# Patient Record
Sex: MALE | Race: WHITE | Employment: OTHER | URBAN - METROPOLITAN AREA
[De-identification: names, ages, dates, MRNs, and addresses within clinical notes are randomized per-mention and may not be internally consistent; named-entity substitution may affect disease eponyms.]

---

## 2022-03-19 PROBLEM — R42 LIGHTHEADEDNESS: Status: ACTIVE | Noted: 2020-07-15

## 2022-03-19 PROBLEM — E66.01 SEVERE OBESITY (HCC): Status: ACTIVE | Noted: 2020-02-26

## 2022-03-19 PROBLEM — R60.0 LEG EDEMA: Status: ACTIVE | Noted: 2020-07-15

## 2023-09-20 PROBLEM — F32.A MILD EPISODE OF DEPRESSION: Status: ACTIVE | Noted: 2023-09-20

## 2023-09-20 PROBLEM — R05.3 CHRONIC COUGH: Status: ACTIVE | Noted: 2023-09-20

## 2023-10-17 ENCOUNTER — OFFICE VISIT (OUTPATIENT)
Facility: CLINIC | Age: 89
End: 2023-10-17
Payer: MEDICARE

## 2023-10-17 VITALS
HEIGHT: 67 IN | SYSTOLIC BLOOD PRESSURE: 129 MMHG | DIASTOLIC BLOOD PRESSURE: 67 MMHG | BODY MASS INDEX: 33.59 KG/M2 | HEART RATE: 80 BPM | RESPIRATION RATE: 20 BRPM | TEMPERATURE: 97.3 F | WEIGHT: 214 LBS

## 2023-10-17 DIAGNOSIS — M54.50 ACUTE LEFT-SIDED LOW BACK PAIN WITHOUT SCIATICA: ICD-10-CM

## 2023-10-17 DIAGNOSIS — R05.3 CHRONIC COUGH: ICD-10-CM

## 2023-10-17 DIAGNOSIS — G25.3 SLEEP MYOCLONUS: Primary | ICD-10-CM
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
## 2023-10-17 DIAGNOSIS — G89.29 CHRONIC LOW BACK PAIN, UNSPECIFIED BACK PAIN LATERALITY, UNSPECIFIED WHETHER SCIATICA PRESENT: ICD-10-CM

## 2023-10-17 DIAGNOSIS — M54.50 CHRONIC LOW BACK PAIN, UNSPECIFIED BACK PAIN LATERALITY, UNSPECIFIED WHETHER SCIATICA PRESENT: ICD-10-CM

## 2023-10-17 PROBLEM — S32.010A COMPRESSION FRACTURE OF L1 LUMBAR VERTEBRA (HCC): Status: RESOLVED | Noted: 2023-09-20 | Resolved: 2023-10-17

## 2023-10-17 PROCEDURE — 99214 OFFICE O/P EST MOD 30 MIN: CPT | Performed by: FAMILY MEDICINE

## 2023-10-17 PROCEDURE — G8484 FLU IMMUNIZE NO ADMIN: HCPCS | Performed by: FAMILY MEDICINE

## 2023-10-17 PROCEDURE — 1123F ACP DISCUSS/DSCN MKR DOCD: CPT | Performed by: FAMILY MEDICINE

## 2023-10-17 PROCEDURE — G8427 DOCREV CUR MEDS BY ELIG CLIN: HCPCS | Performed by: FAMILY MEDICINE

## 2023-10-17 PROCEDURE — 1036F TOBACCO NON-USER: CPT | Performed by: FAMILY MEDICINE

## 2023-10-17 PROCEDURE — G8417 CALC BMI ABV UP PARAM F/U: HCPCS | Performed by: FAMILY MEDICINE

## 2023-10-17 NOTE — PROGRESS NOTES
Chief Complaint   Patient presents with    Follow-up     4 wk fu would like results for swallow test;  still having back pn and feels miserable; his leg jerking has gotten worse over the last year

## 2023-10-17 NOTE — PROGRESS NOTES
ASSESSMENT/PLAN:  Mr. Marcela Grace is a 81 y.o. male established patient who presents for Follow-up (4 wk fu would like results for swallow test;  still having back pn and feels miserable; his leg jerking has gotten worse over the last year /)  , found to have the followin. Sleep myoclonus  Assessment & Plan:   unsure of etiology ,could be stress or back injury keeping him up, fading in and out of REM sleep. no caffeien or nicotine. there is some stresses between loved ones regarding health problems. 2. Acute left-sided low back pain without sciatica  Assessment & Plan:   Uncontrolled, get imaging to ensure no new compression fractures. Get in with physical therapy. try topical NSAIDs, heat, rest.  Orders:  -     XR THORACOLUMBAR SPINE (MIN 2 VIEWS); Future  -     diclofenac sodium (VOLTAREN) 1 % GEL; Apply 4 g topically 4 times daily, Topical, 4 TIMES DAILY Starting Tue 10/17/2023, Disp-350 g, R-3, Normal  -     [address] - Physical Therapy at [place], [place]  3. Chronic low back pain, unspecified back pain laterality, unspecified whether sciatica present  -     XR THORACOLUMBAR SPINE (MIN 2 VIEWS); Future  -     diclofenac sodium (VOLTAREN) 1 % GEL; Apply 4 g topically 4 times daily, Topical, 4 TIMES DAILY Starting Tue 10/17/2023, Disp-350 g, R-3, Normal  -     [address] - Physical Therapy at [place], [place]  4. Fall, initial encounter  Assessment & Plan:   Uncontrolled, changes made today: get physical therapy with balance and gait training. Orders:  -     XR THORACOLUMBAR SPINE (MIN 2 VIEWS); Future  -     [address] - Physical Therapy at [place], [place]  5. Chronic cough  Assessment & Plan:   Well-controlled, continue current plan        It was a pleasure seeing Mr. Marcela Grace today. Return in about 8 weeks (around 2023) for low back pain management.       SUBJECTIVE:     HPI  Mr. Marcela Grace is a 81 y.o. male established patient who presents for the following

## 2023-10-17 NOTE — ASSESSMENT & PLAN NOTE
Uncontrolled, get imaging to ensure no new compression fractures. Get in with physical therapy.  try topical NSAIDs, heat, rest.

## 2023-10-17 NOTE — ASSESSMENT & PLAN NOTE
unsure of etiology ,could be stress or back injury keeping him up, fading in and out of REM sleep. no caffeien or nicotine. there is some stresses between loved ones regarding health problems.

## 2023-10-27 ENCOUNTER — TELEPHONE (OUTPATIENT)
Facility: CLINIC | Age: 89
End: 2023-10-27

## 2023-11-07 ENCOUNTER — HOSPITAL ENCOUNTER (OUTPATIENT)
Facility: HOSPITAL | Age: 89
Discharge: HOME | End: 2023-11-10
Payer: MEDICARE

## 2023-11-07 DIAGNOSIS — M54.50 ACUTE LEFT-SIDED LOW BACK PAIN WITHOUT SCIATICA: ICD-10-CM

## 2023-11-07 DIAGNOSIS — M54.50 CHRONIC LOW BACK PAIN, UNSPECIFIED BACK PAIN LATERALITY, UNSPECIFIED WHETHER SCIATICA PRESENT: ICD-10-CM
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
## 2023-11-07 DIAGNOSIS — G89.29 CHRONIC LOW BACK PAIN, UNSPECIFIED BACK PAIN LATERALITY, UNSPECIFIED WHETHER SCIATICA PRESENT: ICD-10-CM

## 2023-11-07 PROCEDURE — 72100 X-RAY EXAM L-S SPINE 2/3 VWS: CPT

## 2023-11-07 PROCEDURE — 72072 X-RAY EXAM THORAC SPINE 3VWS: CPT

## 2023-11-15 ENCOUNTER — APPOINTMENT (OUTPATIENT)
Facility: HOSPITAL | Age: 89
End: 2023-11-15
Payer: MEDICARE

## 2023-11-16 ENCOUNTER — APPOINTMENT (OUTPATIENT)
Facility: HOSPITAL | Age: 89
End: 2023-11-16
Payer: MEDICARE
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
## 2023-11-17 ENCOUNTER — HOSPITAL ENCOUNTER (OUTPATIENT)
Facility: HOSPITAL | Age: 89
Setting detail: RECURRING SERIES
Discharge: HOME | End: 2023-11-20
Payer: MEDICARE

## 2023-11-17 PROCEDURE — 97161 PT EVAL LOW COMPLEX 20 MIN: CPT

## 2023-11-17 PROCEDURE — 97140 MANUAL THERAPY 1/> REGIONS: CPT

## 2023-11-17 PROCEDURE — 97535 SELF CARE MNGMENT TRAINING: CPT

## 2023-11-17 NOTE — THERAPY EVALUATION
[facility]   a part of [facility]  [address] [facility], [address] [place] [address]   Jemma Alexis                                                    [facility]:[phone]   [facility]:[phone]                                                                                 PHYSICAL THERAPY LYMPHEDEMA - MEDICARE EVALUATION/PLAN OF CARE NOTE (updated 3/23)      Date: 2023          Patient Name:  Jada Nogueira. :  1934   Medical   Diagnosis:  Lymphedema, not elsewhere classified [I89.0] Treatment Diagnosis:  I89.0     Lymphedema, not elsewhere classified, R26.89   Other abnormalities of gait and mobility, and R60.9     Edema, unspecified    Referral Source:  Adrianna Arango MD  Provider #:  [id number]                Insurance: Payor: MEDICARE / Plan: MEDICARE PART A AND B / Product Type: *No Product type* /        Patient  verified yes     Visit #   Current  / Total 1 N/A   Time   In / Out 1425 1535   Total Treatment Time 70   Total Timed Codes 55   1:1 Treatment Time 79      [facility] Totals Reminder:  bill using total billable   min of TIMED therapeutic procedures and modalities. 8-22 min = 1 unit; 23-37 min = 2 units; 38-52 min = 3 units;  53-67 min = 4 units; 68-82 min = 5 units         SUBJECTIVE  Pain Level (0-10 scale): pt denies pain   []constant [x]intermittent []improving []worsening []no change since onset    Any medication changes, allergies to medications, adverse drug reactions, diagnosis change, or new procedure performed?: [x] No    [] Yes (see summary sheet for update)  Medications: Verified on Patient Summary List    Subjective functional status/changes:     Pt is an 80 yo male seen today for 6 month re-evaluation of bilateral LE secondary lymphedema, L>R. [facility]o [facility] leg velcro garments, says he wears them every day and wears them on left leg every night.  Pt does not wear foot wraps when going out, but wears lace up shoe to

## 2023-11-17 NOTE — THERAPY EVALUATION
Statement of Medical Necessity    Page 1 of 2      Landis Kawasaki. 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    1934  Page 2 of 2    Physician Order for DME for Diagnosis of Secondary Lymphedema as Listed on Statement of Medical Necessity, Page 1        Recommended Product:  Units Upper Extremity Rt Lt Units Lower Extremity Rt Lt    Circ-Aid, Ready Wrap, Sigvaris Arm   4 Inelastic binders (Circ-Aid, Farrow)  [x]Foot   [x]Below Knee   []Knee   []Thigh x x    Circ-Aid Ready Wrap, Sigvaris hand    Francis Juan R, night use []Full Leg  []Lower Leg      Tribute Arm, night use    Tribute, night use  []Full Leg  []Lower Leg      Francis Juan R Arm, night use    Jose Sleeve Leg/ Foot, night use      Gradiant Compression Sleeves & Gloves  []Custom [] RM Arm:  []CCL1 []CCL2 []CCL3  []Custom [] RM Glove: []CCL1 []CCL2 []CCL3      Gradient Compression Stockings   []Custom  []RM Lower Extremity:   []CCL1       []CCL2         []CCL3   []Knee       []Thigh        []Waist Length      Jose sleeve arm w/ hand, night use    Tribute Wrap, night use      Compression Bra          Compression Vest         The above patient was referred for treatment of Lymphedema due to the diagnosis

## 2023-11-22 ENCOUNTER — TELEPHONE (OUTPATIENT)
Facility: CLINIC | Age: 89
End: 2023-11-22

## 2023-12-18 RX ORDER — PANTOPRAZOLE SODIUM 40 MG/1
40 TABLET, DELAYED RELEASE ORAL
Qty: 90 TABLET | Refills: 0 | OUTPATIENT
Start: 2023-12-18

## 2024-01-04 ENCOUNTER — HOSPITAL ENCOUNTER (OUTPATIENT)
Facility: HOSPITAL | Age: 89
Setting detail: RECURRING SERIES
Discharge: HOME | End: 2024-01-07
Payer: MEDICARE

## 2024-01-04 PROCEDURE — 97140 MANUAL THERAPY 1/> REGIONS: CPT

## 2024-01-04 PROCEDURE — 97760 ORTHOTIC MGMT&TRAING 1ST ENC: CPT

## 2024-01-04 NOTE — PROGRESS NOTES
PHYSICAL THERAPY/OCCUPATIONAL THERAPY - MEDICARE DAILY TREATMENT NOTE (updated 3/23)      Date: 2024          Patient Name:  Clifton Liz Jr. :  1934   Medical   Diagnosis:  Lymphedema, not elsewhere classified [I89.0] Treatment Diagnosis:  I89.0     Lymphedema, not elsewhere classified, R26.89   Other abnormalities of gait and mobility, and R60.9     Edema, unspecified    Referral Source:  Gayatri Vaca, * Insurance:   Payor: MEDICARE / Plan: MEDICARE PART A AND B / Product Type: *No Product type* /                     Patient  verified yes     Visit #   Current  / Total 2 6 per POC   Time   In / Out 1130 1240   Total Treatment Time 70   Total Timed Codes 70   1:1 Treatment Time 70       [facility] Totals Reminder:  bill using total billable   min of TIMED therapeutic procedures and modalities.   8-22 min = 1 unit; 23-37 min = 2 units; 38-52 min = 3 units;  53-67 min = 4 units; 68-82 min = 5 units         SUBJECTIVE    Pain Level (0-10 scale): pt denies pain     Any medication changes, allergies to medications, adverse drug reactions, diagnosis change, or new procedure performed?: [x] No    [] Yes (see summary sheet for update)  Medications: Verified on Patient Summary List    Subjective functional status/changes:     Pt arrives to appt wearing Juzo lower leg velcro garments. Pt says he has been wearing garments daily on both legs and on left LE at night. Pt has not used pump since last visit as discussed. Pt has been going to outpatient PT 2x/week for strengthening/balance training. Pt denies any falls since last visit. No changes to medical history since last visit. Pt agreeable to treatment.     OBJECTIVE      Therapeutic Procedures:  Tx Min Billable or 1:1 Min (if diff from Tx Min) Procedure, Rationale, Specifics   30  44814 Manual Therapy (timed):  increase tissue extensibility and decrease edema to improve patient's ability to progress to PLOF and address remaining functional goals.

## 2024-01-04 NOTE — PROGRESS NOTES
[facility]   a part of [facility]  [address], [address]   [place] [address]   Phone: [phone]  Fax: [phone]      DISCHARGE SUMMARY  Patient Name: Clifton Liz Jr. : 1934   Treatment/Medical Diagnosis: Lymphedema, not elsewhere classified [I89.0]   Referral Source: Gayatri Vaca, *     Date of Initial Visit: 23 Attended Visits: 2 Missed Visits: 0     SUMMARY OF TREATMENT  Pt seen today for volume recheck after discontinuing use of pump in November since pt/wife having difficult time using pump. Pt's limb volumes increased this visit, however remain down since 2023. Pt currently wearing garments that are older and velcro not adhering well. Pt unsure where newer velcro wraps are that were fit in May 2023. Volumes may be up due to garments not providing enough compression as the straps are stretched out and not staying in place. Will send order for new garments to [facility] in size Large Long since current wraps are too short. Pt does not wear foot wrap and foot swelling seems managed adequately at this time with use of hybrid liners. Discussed using pump 2-3x/week to lessen the burden on pt's spouse. Pt/spouse in agreement with plan. Recommend pt return in 6 months for re-evaluation or sooner as needed. Goals met and pt discharged to phase II CDT today.     CURRENT STATUS    Patient to perform 5/5 lymphedema remedial exercises in session with modified independence utilizing HEP handout, in order to promote optimal independence with management of condition, as well as promote optimal limb volume reduction required for proper fit of donned clothing in 6 weeks.   Status at last Eval: not met   Current Status: Met 24  Goal Met?  yes    2.  Patient/Caregiver to verbalize 3/3 signs and symptoms of infection without external cueing, in order to promote optimal self-management of condition in 6 weeks.   Status at last Eval: not met

## 2024-02-29 ENCOUNTER — TELEPHONE (OUTPATIENT)
Age: 89
End: 2024-02-29

## 2024-02-29 NOTE — TELEPHONE ENCOUNTER
Niesha from [facility] was calling to see if we got a prescription for patients compression garments   This was faxed over on February 19 and will refax to nurses side station

## 2024-02-29 NOTE — TELEPHONE ENCOUNTER
Nurse returned call and advised patient no longer a patient at this office and gave new pcp number to call with any questions.

## 2024-03-23 ENCOUNTER — HOSPITAL ENCOUNTER (EMERGENCY)
Facility: HOSPITAL | Age: 89
Discharge: HOME | End: 2024-03-23
Attending: EMERGENCY MEDICINE
Payer: MEDICARE

## 2024-03-23 ENCOUNTER — APPOINTMENT (OUTPATIENT)
Facility: HOSPITAL | Age: 89
End: 2024-03-23
Payer: MEDICARE

## 2024-03-23 VITALS
DIASTOLIC BLOOD PRESSURE: 75 MMHG | WEIGHT: 210 LBS | HEIGHT: 67 IN | BODY MASS INDEX: 32.96 KG/M2 | RESPIRATION RATE: 17 BRPM | OXYGEN SATURATION: 97 % | HEART RATE: 99 BPM | TEMPERATURE: 98.4 F | SYSTOLIC BLOOD PRESSURE: 146 MMHG

## 2024-03-23 DIAGNOSIS — R05.1 ACUTE COUGH: Primary | ICD-10-CM

## 2024-03-23 PROCEDURE — 71046 X-RAY EXAM CHEST 2 VIEWS: CPT

## 2024-03-23 PROCEDURE — 99283 EMERGENCY DEPT VISIT LOW MDM: CPT

## 2024-03-23 RX ORDER — CETIRIZINE HYDROCHLORIDE 5 MG/1
5 TABLET ORAL DAILY
Qty: 30 TABLET | Refills: 0 | Status: SHIPPED | OUTPATIENT
Start: 2024-03-23 | End: 2024-04-22

## 2024-03-23 RX ORDER — GUAIFENESIN 600 MG/1
600 TABLET, EXTENDED RELEASE ORAL 2 TIMES DAILY
Qty: 30 TABLET | Refills: 0 | Status: SHIPPED | OUTPATIENT
Start: 2024-03-23 | End: 2024-04-07

## 2024-03-23 ASSESSMENT — ENCOUNTER SYMPTOMS
SORE THROAT: 0
CONSTIPATION: 0
SHORTNESS OF BREATH: 0

## 2024-03-23 ASSESSMENT — PAIN SCALES - GENERAL: PAINLEVEL_OUTOF10: 0

## 2024-03-23 ASSESSMENT — PAIN - FUNCTIONAL ASSESSMENT: PAIN_FUNCTIONAL_ASSESSMENT: 0-10

## 2024-03-23 NOTE — ED TRIAGE NOTES
Patient arrives in the ED ambulatory, alert and oriented x 4, breaths even and unlabored and in no acute distress with complaints of cough x2 days pt denies sob or chest pain or being around anyone sick.    Pt denies productive cough ear pain and throat pain.

## 2024-03-23 NOTE — ED PROVIDER NOTES
mouth daily    BETAMETHASONE DIPROPIONATE 0.05 % OINTMENT    Apply topically    COLCHICINE (COLCRYS) 0.6 MG TABLET    One PO stat, repeat in 2 hours, then one PO QD    DICLOFENAC SODIUM (VOLTAREN) 1 % GEL    Apply 4 g topically 4 times daily    HYDROCORTISONE 2.5 % CREAM    Apply topically 2 times daily    VITAMIN D PO    Take by mouth       ALLERGIES     Patient has no known allergies.    FAMILY HISTORY       Family History   Problem Relation Age of Onset    No Known Problems Half-Brother     No Known Problems Half-Sister     Cancer Father         leukemia    Cancer Mother     No Known Problems Daughter     No Known Problems Son     No Known Problems Son           SOCIAL HISTORY       Social History     Socioeconomic History    Marital status:      Spouse name: None    Number of children: None    Years of education: None    Highest education level: None   Tobacco Use    Smoking status: Former    Smokeless tobacco: Never   Substance and Sexual Activity    Alcohol use: Yes    Drug use: Never     Social Determinants of Health     Physical Activity: Inactive (9/20/2023)    Exercise Vital Sign     Days of Exercise per Week: 0 days     Minutes of Exercise per Session: 0 min           PHYSICAL EXAM    (up to 7 for level 4, 8 or more for level 5)     ED Triage Vitals [03/23/24 1015]   BP Temp Temp Source Pulse Respirations SpO2 Height Weight - Scale   (!) 146/75 98.4 °F (36.9 °C) Oral 99 17 97 % 1.702 m (5' 7\") 95.3 kg (210 lb)       Body mass index is 32.89 kg/m².    Physical Exam  Constitutional:       Appearance: He is not ill-appearing.   HENT:      Head: Normocephalic.      Mouth/Throat:      Mouth: Mucous membranes are moist.   Eyes:      General: No scleral icterus.  Cardiovascular:      Rate and Rhythm: Normal rate and regular rhythm.   Pulmonary:      Effort: Pulmonary effort is normal. No respiratory distress.      Breath sounds: No wheezing or rhonchi.   Abdominal:      General: There is no distension.

## 2024-03-29 ENCOUNTER — OFFICE VISIT (OUTPATIENT)
Facility: CLINIC | Age: 89
End: 2024-03-29

## 2024-03-29 VITALS
SYSTOLIC BLOOD PRESSURE: 114 MMHG | BODY MASS INDEX: 32.18 KG/M2 | HEIGHT: 67 IN | RESPIRATION RATE: 20 BRPM | TEMPERATURE: 97.6 F | OXYGEN SATURATION: 93 % | DIASTOLIC BLOOD PRESSURE: 67 MMHG | WEIGHT: 205 LBS | HEART RATE: 105 BPM

## 2024-03-29 DIAGNOSIS — M10.9 GOUT, UNSPECIFIED CAUSE, UNSPECIFIED CHRONICITY, UNSPECIFIED SITE: ICD-10-CM

## 2024-03-29 DIAGNOSIS — J06.9 VIRAL URI: ICD-10-CM

## 2024-03-29 DIAGNOSIS — R73.03 PREDIABETES: ICD-10-CM

## 2024-03-29 DIAGNOSIS — B35.6 JOCK ITCH: Primary | ICD-10-CM

## 2024-03-29 DIAGNOSIS — B30.9 VIRAL CONJUNCTIVITIS, RIGHT EYE: ICD-10-CM

## 2024-03-29 PROBLEM — R53.83 FATIGUE: Status: RESOLVED | Noted: 2024-03-29 | Resolved: 2024-03-29

## 2024-03-29 PROBLEM — D36.9 TUBULOVILLOUS ADENOMA: Status: RESOLVED | Noted: 2020-05-20 | Resolved: 2024-03-29

## 2024-03-29 PROBLEM — R60.9 FLUID RETENTION: Status: RESOLVED | Noted: 2024-03-29 | Resolved: 2024-03-29

## 2024-03-29 PROBLEM — R42 LIGHTHEADEDNESS: Status: RESOLVED | Noted: 2020-07-15 | Resolved: 2024-03-29

## 2024-03-29 PROBLEM — M19.90 ARTHRITIS: Status: RESOLVED | Noted: 2024-03-29 | Resolved: 2024-03-29

## 2024-03-29 PROBLEM — M17.12 ARTHRITIS OF LEFT KNEE: Status: RESOLVED | Noted: 2019-07-18 | Resolved: 2024-03-29

## 2024-03-29 PROBLEM — M54.50 ACUTE LEFT-SIDED LOW BACK PAIN WITHOUT SCIATICA: Status: RESOLVED | Noted: 2023-10-17 | Resolved: 2024-03-29

## 2024-03-29 RX ORDER — CLOTRIMAZOLE 1 %
1 CREAM (GRAM) TOPICAL 2 TIMES DAILY
COMMUNITY
Start: 2024-02-29

## 2024-03-29 RX ORDER — PRENATAL VIT 91/IRON/FOLIC/DHA 28-975-200
COMBINATION PACKAGE (EA) ORAL
Qty: 42 G | Refills: 3 | Status: SHIPPED | OUTPATIENT
Start: 2024-03-29

## 2024-03-29 ASSESSMENT — PATIENT HEALTH QUESTIONNAIRE - PHQ9
SUM OF ALL RESPONSES TO PHQ QUESTIONS 1-9: 0
SUM OF ALL RESPONSES TO PHQ QUESTIONS 1-9: 0
1. LITTLE INTEREST OR PLEASURE IN DOING THINGS: NOT AT ALL
SUM OF ALL RESPONSES TO PHQ QUESTIONS 1-9: 0
SUM OF ALL RESPONSES TO PHQ QUESTIONS 1-9: 0
SUM OF ALL RESPONSES TO PHQ9 QUESTIONS 1 & 2: 0
2. FEELING DOWN, DEPRESSED OR HOPELESS: NOT AT ALL

## 2024-03-29 NOTE — PATIENT INSTRUCTIONS
To help with cough you will want to use anything that would help with sinus congestion and runny nose, such as Flonase and or azelastine nasal spray which is an antihistamine nasal spray that can both be found over-the-counter.  You can also do Laila pot rinsing if you feel comfortable doing it which involves flushing sterile saline up your nose.  Other options include saline nasal spray such as oceans nasal spray which is also found over-the-counter.  Ultimately time will help to resolve this acute cough.  It can take 1 to 2 months to see complete resolution of the cough though it should continue to get slowly better each week.    Recommend changing washcloth every use to avoid re-infection and spread.   Try second topical antifungal to use with clotrimazole.

## 2024-03-29 NOTE — PROGRESS NOTES
SUBJECTIVES  with respective ASSESSMENT/PLAN:  Mr. Clifton Liz is a 89 y.o. male established patient who presents for Follow-up (Fu back pain has resolved slightly.  Has been having productive cough times two weeks green in nature.  No fevers noted. Does have loss of appetite/)  , found to have the followin. Jock itch  Overview:  Dx last year, returned because ran out too early on meds with no refills.  Restarted clotrimazole cream twice a day which has helped to be back infection for the most part, primarily under the armpits, though it has been resistant to the groin area.  Patient showers once every day or 2.  Less frequently in the winter.  Is using the same washcloth though and has not had that wash in a while.  Also is not doing specific air drying techniques.  Though does change underwear once a day.  Assessment & Plan:  Uncontrolled, seems to partially respond to clotrimazole cream, will add terbinafine cream to take as well for 2 different mechanisms of treatment.  Do recommend that we change washcloths every use given likely reinfection and associated spread to the armpits and some to the beard line as well.  Will continue to work on hygiene and drying efforts and hopefully this will be resolved in the next few months.  Orders:  -     terbinafine (ATHLETES FOOT, TERBINAFINE,) 1 % cream; Apply topically 2 times daily., Disp-42 g, R-3, Normal  2. Viral URI  3. Viral conjunctivitis, right eye  Overview:  2 days in the setting of 7 days of upper respiratory infection and acute cough. Copious postnasal drips, no trouble breathing or upset stomach/diarrhea. No fever/chills.    No problems with vision. No itching eye.  Assessment & Plan:  Conservative cares recommended, if getting swollen eye or trouble with vision, should see eye doctor as soon as possible. no indicatio nfor allergy drops today. No indications for topical antibiotic today. No injury appreciated.  4. Gout, unspecified cause,

## 2024-03-29 NOTE — PROGRESS NOTES
Chief Complaint   Patient presents with    Follow-up     Fu back pain has resolved slightly.  Has been having productive cough times two weeks green in nature.  No fevers noted. Does have loss of appetite

## 2024-03-29 NOTE — ASSESSMENT & PLAN NOTE
Uncontrolled, seems to partially respond to clotrimazole cream, will add terbinafine cream to take as well for 2 different mechanisms of treatment.  Do recommend that we change washcloths every use given likely reinfection and associated spread to the armpits and some to the beard line as well.  Will continue to work on hygiene and drying efforts and hopefully this will be resolved in the next few months.

## 2024-03-29 NOTE — ASSESSMENT & PLAN NOTE
Conservative cares recommended, if getting swollen eye or trouble with vision, should see eye doctor as soon as possible. no indicatio nfor allergy drops today. No indications for topical antibiotic today. No injury appreciated.

## 2024-05-07 NOTE — TELEPHONE ENCOUNTER
[facility] pharmacy [address] faxed refill request:    Hydrocortisone 2.5% cream   Quantity: 28 Gm twenty eight  Sig: Apply to affected area twice a day.   Kiera

## 2024-05-10 ENCOUNTER — HOSPITAL ENCOUNTER (EMERGENCY)
Age: 89
Discharge: HOME | End: 2024-05-10

## 2024-06-20 ENCOUNTER — OFFICE VISIT (OUTPATIENT)
Facility: CLINIC | Age: 89
End: 2024-06-20

## 2024-06-20 VITALS
WEIGHT: 205 LBS | TEMPERATURE: 97.1 F | RESPIRATION RATE: 20 BRPM | DIASTOLIC BLOOD PRESSURE: 66 MMHG | HEART RATE: 76 BPM | BODY MASS INDEX: 32.11 KG/M2 | SYSTOLIC BLOOD PRESSURE: 114 MMHG | OXYGEN SATURATION: 98 %

## 2024-06-20 DIAGNOSIS — R73.03 PREDIABETES: Primary | ICD-10-CM

## 2024-06-20 DIAGNOSIS — B35.6 JOCK ITCH: ICD-10-CM

## 2024-06-20 DIAGNOSIS — N50.9 SKIN LESION OF SCROTUM: ICD-10-CM

## 2024-06-20 DIAGNOSIS — R05.2 SUBACUTE COUGH: ICD-10-CM

## 2024-06-20 NOTE — ASSESSMENT & PLAN NOTE
Exam normal, slight increased post nasal dripage. Likely phlegm clearing of nighttime drainage with breakfast, vs mild reflux. Recommended time, OTC PPI/pepcid, return in 2 months if not resolved.

## 2024-06-20 NOTE — ASSESSMENT & PLAN NOTE
Ddx: epidermal inclusion cysts/sebaceous cysts of scrotum vs scrotal calcinosis (less likely, not as bad looking and a rare condition), less likely tophi given nontender though does have hx gout unmedicated.

## 2024-06-20 NOTE — PROGRESS NOTES
Chief Complaint   Patient presents with    Follow-up     Pt reports for 3 mo fu has concerns for: no improvement with rash in scrotum.  Went to derm for same thing and was told to put tea bags on his rash for ten minutes a day

## 2024-06-20 NOTE — PROGRESS NOTES
SUBJECTIVES  with respective ASSESSMENT/PLAN:  Mr. Clifton Liz is a 89 y.o. male established patient who presents for Follow-up (Pt reports for 3 mo fu has concerns for: no improvement with rash in scrotum.  Went to derm for same thing and was told to put tea bags on his rash for ten minutes a day /)  , found to have the followin. Prediabetes  Overview:  Hemoglobin A1C   Date Value Ref Range Status   03/15/2023 6.0 (H) 4.0 - 5.6 % Final     Comment:     NEW METHOD  PLEASE NOTE NEW REFERENCE RANGE  (NOTE)  HbA1C Interpretive Ranges  <5.7              Normal  5.7 - 6.4         Consider Prediabetes  >6.5              Consider Diabetes        Noted previously, recheck  Assessment & Plan:   Unclear control, continue current plan pending work up below  2. Subacute cough  Overview:  2 weeks of mild morning cough while eating breakfast, no present during other meal times, feels a bit \"phlegmy\" while eating first meal, leads to cough. Negatie swallow study for similar symptoms last year, responded empirically to short round of PPIs. Doing well otherwise, no systemic or GI symptoms, no respiratory complaints.   Assessment & Plan:  Exam normal, slight increased post nasal dripage. Likely phlegm clearing of nighttime drainage with breakfast, vs mild reflux. Recommended time, OTC PPI/pepcid, return in 2 months if not resolved.  3. Jock itch  Overview:  Dx last year, returned because ran out too early on meds with no refills.  Restarted clotrimazole cream twice a day which has helped to be back infection for the most part, primarily under the armpits, though it has been resistant to the groin area.  Patient showers once every day or 2.  Less frequently in the winter.  Is using the same washcloth though and has not had that wash in a while.  Also is not doing specific air drying techniques.  Though does change underwear once a day.    Last visit topical antifungal creams, resolved the itch and redness.   Assessment &

## 2024-07-16 DIAGNOSIS — I89.0 LYMPHEDEMA: Primary | ICD-10-CM

## 2024-07-25 ENCOUNTER — HOSPITAL ENCOUNTER (OUTPATIENT)
Facility: HOSPITAL | Age: 89
Setting detail: RECURRING SERIES
Discharge: HOME | End: 2024-07-28
Payer: MEDICARE

## 2024-07-25 DIAGNOSIS — M10.9 GOUT, UNSPECIFIED CAUSE, UNSPECIFIED CHRONICITY, UNSPECIFIED SITE: ICD-10-CM

## 2024-07-25 DIAGNOSIS — R73.03 PREDIABETES: ICD-10-CM

## 2024-07-25 PROCEDURE — 97161 PT EVAL LOW COMPLEX 20 MIN: CPT

## 2024-07-25 PROCEDURE — 97535 SELF CARE MNGMENT TRAINING: CPT

## 2024-07-25 NOTE — THERAPY EVALUATION
[facility]   a part of [facility]  [address], [address]   [place] [address]                                                    Phone:[phone]   Fax:[phone]                                                                                 PHYSICAL THERAPY LYMPHEDEMA - MEDICARE EVALUATION/PLAN OF CARE NOTE (updated 3/23)      Date: 2024          Patient Name:  Clifton Liz Jr. :  1934   Medical   Diagnosis:  Lymphedema, not elsewhere classified [I89.0] Treatment Diagnosis:  I89.0     Lymphedema, not elsewhere classified, R26.89   Other abnormalities of gait and mobility, and R60.9     Edema, unspecified    Referral Source:  Jose J Escobar MD  Provider #:  [id number]                Insurance: Payor: MEDICARE / Plan: MEDICARE PART A AND B / Product Type: *No Product type* /        Patient  verified yes     Visit #   Current  / Total 1 N/A   Time   In / Out 1428 1510   Total Treatment Time 42   Total Timed Codes 30   1:1 Treatment Time 30      [facility] Totals Reminder:  bill using total billable   min of TIMED therapeutic procedures and modalities.   8-22 min = 1 unit; 23-37 min = 2 units; 38-52 min = 3 units;  53-67 min = 4 units; 68-82 min = 5 units         SUBJECTIVE  Pain Level (0-10 scale): pt denies pain   []constant [x]intermittent []improving []worsening []no change since onset    Any medication changes, allergies to medications, adverse drug reactions, diagnosis change, or new procedure performed?: [x] No    [] Yes (see summary sheet for update)  Medications: Verified on Patient Summary List    Subjective functional status/changes:     Pt is an 89 yo male seen today for 6 month re-evaluation of bilateral LE secondary lymphedema, L>R. Patient arrives to appt wearing Juzo lower leg velcro garments, says he wears them every day and wears them on left leg every night. Pt does not wear foot wraps when going out, but wears lace up shoe to 
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 [x]   I87.2 Venous Insufficiency   [x]   I89.0 Lymphedema, other secondary  []   I83.019 Venous Stasis Ulcer LE, Right   []   I89.9 Unspecified Lymphatic Disorder []   I83.029 Venous Stasis Ulcer LE, Left   [x]   R60.9 Swelling not relieved by elevation []   Q82.0 Hereditary/ Congenital Lymphedema   []   C50.211 Malignant neoplasm of breast, Right []   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   []   C50.212 Malignant neoplasm of breast, Left []   L03.115 LE Cellulitis, Right   []    []   L03.116 LE Cellulitis, Left                                                           Clifton Liz JrSavanna    1934  Page 2 of 2    Physician Order for DME for Diagnosis of Secondary Lymphedema as Listed on Statement of Medical Necessity, Page 1        Recommended Product:  Units Upper Extremity Rt Lt Units Lower Extremity Rt Lt    Circ-Aid, Ready Wrap, Sigvaris Arm   4 Inelastic binders (Circ-Aid, Farrow)  [x]Foot   [x]Below Knee   []Knee   []Thigh x x    Circ-Aid Ready Wrap, Sigvaris hand    Francis Juan R, night use []Full Leg  []Lower Leg      Tribute Arm, night use    Tribute, night use  []Full Leg  []Lower Leg      Francis Juan R Arm, night use    Jose Sleeve Leg/ Foot, night use      Gradiant Compression Sleeves & Gloves  []Custom [] RM Arm:  []CCL1 []CCL2 []CCL3  []Custom [] RM Glove: []CCL1 []CCL2 []CCL3      Gradient Compression Stockings   []Custom  []RM Lower Extremity:   []CCL1       []CCL2         []CCL3   []Knee       []Thigh        []Waist Length      Jose sleeve arm w/ hand, night use    Tribute Wrap, night use      Compression Bra          Compression Vest         The above patient was referred for treatment of Lymphedema due to the diagnosis 
 used

## 2024-07-26 LAB
ALBUMIN SERPL-MCNC: 3.8 G/DL (ref 3.5–5)
ALBUMIN/GLOB SERPL: 1.3 (ref 1.1–2.2)
ALP SERPL-CCNC: 68 U/L (ref 45–117)
ALT SERPL-CCNC: 22 U/L (ref 12–78)
ANION GAP SERPL CALC-SCNC: 4 MMOL/L (ref 5–15)
AST SERPL-CCNC: 18 U/L (ref 15–37)
BILIRUB SERPL-MCNC: 0.6 MG/DL (ref 0.2–1)
BUN SERPL-MCNC: 14 MG/DL (ref 6–20)
BUN/CREAT SERPL: 19 (ref 12–20)
CALCIUM SERPL-MCNC: 9.5 MG/DL (ref 8.5–10.1)
CHLORIDE SERPL-SCNC: 104 MMOL/L (ref 97–108)
CO2 SERPL-SCNC: 31 MMOL/L (ref 21–32)
CREAT SERPL-MCNC: 0.73 MG/DL (ref 0.7–1.3)
ERYTHROCYTE [DISTWIDTH] IN BLOOD BY AUTOMATED COUNT: 14.2 % (ref 11.5–14.5)
EST. AVERAGE GLUCOSE BLD GHB EST-MCNC: 120 MG/DL
GLOBULIN SER CALC-MCNC: 3 G/DL (ref 2–4)
GLUCOSE SERPL-MCNC: 106 MG/DL (ref 65–100)
HBA1C MFR BLD: 5.8 % (ref 4–5.6)
HCT VFR BLD AUTO: 44.1 % (ref 36.6–50.3)
HGB BLD-MCNC: 14.5 G/DL (ref 12.1–17)
MCH RBC QN AUTO: 30.8 PG (ref 26–34)
MCHC RBC AUTO-ENTMCNC: 32.9 G/DL (ref 30–36.5)
MCV RBC AUTO: 93.6 FL (ref 80–99)
NRBC # BLD: 0 K/UL (ref 0–0.01)
NRBC BLD-RTO: 0 PER 100 WBC
PLATELET # BLD AUTO: 253 K/UL (ref 150–400)
PMV BLD AUTO: 10.8 FL (ref 8.9–12.9)
POTASSIUM SERPL-SCNC: 4.6 MMOL/L (ref 3.5–5.1)
PROT SERPL-MCNC: 6.8 G/DL (ref 6.4–8.2)
RBC # BLD AUTO: 4.71 M/UL (ref 4.1–5.7)
SODIUM SERPL-SCNC: 139 MMOL/L (ref 136–145)
URATE SERPL-MCNC: 6.7 MG/DL (ref 3.5–7.2)
WBC # BLD AUTO: 8.2 K/UL (ref 4.1–11.1)

## 2024-12-18 ENCOUNTER — OFFICE VISIT (OUTPATIENT)
Facility: CLINIC | Age: 89
End: 2024-12-18

## 2024-12-18 ENCOUNTER — TELEPHONE (OUTPATIENT)
Facility: CLINIC | Age: 89
End: 2024-12-18

## 2024-12-18 VITALS
OXYGEN SATURATION: 95 % | WEIGHT: 211 LBS | BODY MASS INDEX: 33.12 KG/M2 | HEART RATE: 80 BPM | RESPIRATION RATE: 20 BRPM | TEMPERATURE: 98 F | SYSTOLIC BLOOD PRESSURE: 113 MMHG | HEIGHT: 67 IN | DIASTOLIC BLOOD PRESSURE: 75 MMHG

## 2024-12-18 DIAGNOSIS — M10.9 GOUT, UNSPECIFIED CAUSE, UNSPECIFIED CHRONICITY, UNSPECIFIED SITE: ICD-10-CM

## 2024-12-18 DIAGNOSIS — Z00.00 MEDICARE ANNUAL WELLNESS VISIT, SUBSEQUENT: Primary | ICD-10-CM

## 2024-12-18 DIAGNOSIS — F32.A MILD EPISODE OF DEPRESSION: ICD-10-CM

## 2024-12-18 DIAGNOSIS — G45.9 TIA (TRANSIENT ISCHEMIC ATTACK): ICD-10-CM

## 2024-12-18 DIAGNOSIS — B35.3 TINEA PEDIS OF RIGHT FOOT: ICD-10-CM

## 2024-12-18 DIAGNOSIS — L21.9 SEBORRHEIC DERMATITIS: ICD-10-CM

## 2024-12-18 DIAGNOSIS — R73.03 PREDIABETES: ICD-10-CM

## 2024-12-18 RX ORDER — CLOPIDOGREL BISULFATE 75 MG/1
75 TABLET ORAL DAILY
COMMUNITY

## 2024-12-18 RX ORDER — KETOCONAZOLE 20 MG/ML
SHAMPOO, SUSPENSION TOPICAL DAILY PRN
Qty: 120 ML | Refills: 5 | Status: SHIPPED | OUTPATIENT
Start: 2024-12-18

## 2024-12-18 SDOH — ECONOMIC STABILITY: INCOME INSECURITY: HOW HARD IS IT FOR YOU TO PAY FOR THE VERY BASICS LIKE FOOD, HOUSING, MEDICAL CARE, AND HEATING?: NOT HARD AT ALL

## 2024-12-18 SDOH — ECONOMIC STABILITY: FOOD INSECURITY: WITHIN THE PAST 12 MONTHS, THE FOOD YOU BOUGHT JUST DIDN'T LAST AND YOU DIDN'T HAVE MONEY TO GET MORE.: NEVER TRUE

## 2024-12-18 SDOH — ECONOMIC STABILITY: FOOD INSECURITY: WITHIN THE PAST 12 MONTHS, YOU WORRIED THAT YOUR FOOD WOULD RUN OUT BEFORE YOU GOT MONEY TO BUY MORE.: NEVER TRUE

## 2024-12-18 ASSESSMENT — PATIENT HEALTH QUESTIONNAIRE - PHQ9
2. FEELING DOWN, DEPRESSED OR HOPELESS: NOT AT ALL
6. FEELING BAD ABOUT YOURSELF - OR THAT YOU ARE A FAILURE OR HAVE LET YOURSELF OR YOUR FAMILY DOWN: NOT AT ALL
SUM OF ALL RESPONSES TO PHQ QUESTIONS 1-9: 0
8. MOVING OR SPEAKING SO SLOWLY THAT OTHER PEOPLE COULD HAVE NOTICED. OR THE OPPOSITE, BEING SO FIGETY OR RESTLESS THAT YOU HAVE BEEN MOVING AROUND A LOT MORE THAN USUAL: NOT AT ALL
SUM OF ALL RESPONSES TO PHQ QUESTIONS 1-9: 0
SUM OF ALL RESPONSES TO PHQ QUESTIONS 1-9: 0
7. TROUBLE CONCENTRATING ON THINGS, SUCH AS READING THE NEWSPAPER OR WATCHING TELEVISION: NOT AT ALL
3. TROUBLE FALLING OR STAYING ASLEEP: NOT AT ALL
4. FEELING TIRED OR HAVING LITTLE ENERGY: NOT AT ALL
SUM OF ALL RESPONSES TO PHQ9 QUESTIONS 1 & 2: 0
9. THOUGHTS THAT YOU WOULD BE BETTER OFF DEAD, OR OF HURTING YOURSELF: NOT AT ALL
10. IF YOU CHECKED OFF ANY PROBLEMS, HOW DIFFICULT HAVE THESE PROBLEMS MADE IT FOR YOU TO DO YOUR WORK, TAKE CARE OF THINGS AT HOME, OR GET ALONG WITH OTHER PEOPLE: NOT DIFFICULT AT ALL
1. LITTLE INTEREST OR PLEASURE IN DOING THINGS: NOT AT ALL
5. POOR APPETITE OR OVEREATING: NOT AT ALL
SUM OF ALL RESPONSES TO PHQ QUESTIONS 1-9: 0

## 2024-12-18 ASSESSMENT — LIFESTYLE VARIABLES
HOW MANY STANDARD DRINKS CONTAINING ALCOHOL DO YOU HAVE ON A TYPICAL DAY: 1 OR 2
HOW OFTEN DO YOU HAVE A DRINK CONTAINING ALCOHOL: 2-4 TIMES A MONTH

## 2024-12-18 NOTE — PROGRESS NOTES
Chief Complaint   Patient presents with    Medicare AWV     Pt was recently discharged from  for a mini stroke, will request records   1. Pt unable to drive at this time- pt is scheduled with neurology, and has been advised he will need to follow up with him.   2. Jock itch cream not working      \"Have you been to the ER, urgent care clinic since your last visit?  Hospitalized since your last visit?\"    NO    “Have you seen or consulted any other health care providers outside of [facility] since your last visit?”    NO            Click Here for Release of Records Request  
additional education material    Poor Eating Habits/Diet:  Do you eat balanced/healthy meals regularly?: (!) No  Interventions:  See AVS for additional education material    Abnormal BMI (obese):  Body mass index is 33.05 kg/m². (!) Abnormal  Interventions:  See AVS for additional education material           Safety:  Do you have any tripping hazards - loose or unsecured carpets or rugs?: (!) Yes  Interventions:  See AVS for additional education material                   Objective   Vitals:    12/18/24 1032   BP: 113/75   Pulse: 80   Resp: 20   Temp: 98 °F (36.7 °C)   TempSrc: Temporal   SpO2: 95%   Weight: 95.7 kg (211 lb)   Height: 1.702 m (5' 7\")      Body mass index is 33.05 kg/m².        Physical Exam  Constitutional: well-appearing, no acute distress  Eyes: EOM intact. PERRL bilateral. Not icteric.  Cardiac: normal rate, regular rhythm.  Pulm: normal rate, normal effort.  Skin: normal color and turgor. Mild scrotal erythema, much improved from prior.  Lower extrem: no edema.              No Known Allergies  Prior to Visit Medications    Medication Sig Taking? Authorizing Provider   clopidogrel (PLAVIX) 75 MG tablet Take 1 tablet by mouth daily Yes Lakisha Berry MD   ketoconazole (NIZORAL) 2 % shampoo Apply topically daily as needed for Itching (scalp and beard during shower) Apply topically daily as needed. Yes Jose J Escobar MD   hydrocortisone 2.5 % cream Apply topically 2 times daily Yes Jose J Escobar MD   terbinafine (ATHLETES FOOT, TERBINAFINE,) 1 % cream Apply topically 2 times daily. Yes Jose J Escobar MD   clotrimazole (LOTRIMIN) 1 % cream Apply 1 Application topically 2 times daily Yes Lakisha Berry MD   VITAMIN D PO Take by mouth Yes Lakisha Berry MD   betamethasone dipropionate 0.05 % ointment Apply topically Yes Automatic Reconciliation, Ar   aspirin 81 MG EC tablet Take 1 tablet by mouth daily  Patient not taking: Reported on 12/18/2024  Automatic Reconciliation, Ar

## 2024-12-18 NOTE — TELEPHONE ENCOUNTER
Pt forgot to ask if he could get the same Home Health Care that his wife is getting.  PT, OT. She has [facility] and wants the same for him.

## 2024-12-18 NOTE — PATIENT INSTRUCTIONS
For jock itch, change wash cloth daily, contniue creams, and work on treating your athlete's foot.    For the athlete's foot, use antifungal cream, change socks daily, and spray shoes with antifungal dry powder spray.     The creams and sprays can be found at any pharmacy over the counter.         Learning About Being Active as an Older Adult  Why is being active important as you get older?     Being active is one of the best things you can do for your health. And it's never too late to start. Being active--or getting active, if you aren't already--has definite benefits. It can:  Give you more energy,  Keep your mind sharp.  Improve balance to reduce your risk of falls.  Help you manage chronic illness with fewer medicines.  No matter how old you are, how fit you are, or what health problems you have, there is a form of activity that will work for you. And the more physical activity you can do, the better your overall health will be.  What kinds of activity can help you stay healthy?  Being more active will make your daily activities easier. Physical activity includes planned exercise and things you do in daily life. There are four types of activity:  Aerobic.  Doing aerobic activity makes your heart and lungs strong.  Includes walking, dancing, and gardening.  Aim for at least 2½ hours spread throughout the week.  It improves your energy and can help you sleep better.  Muscle-strengthening.  This type of activity can help maintain muscle and strengthen bones.  Includes climbing stairs, using resistance bands, and lifting or carrying heavy loads.  Aim for at least twice a week.  It can help protect the knees and other joints.  Stretching.  Stretching gives you better range of motion in joints and muscles.  Includes upper arm stretches, calf stretches, and gentle yoga.  Aim for at least twice a week, preferably after your muscles are warmed up from other activities.  It can help you function better in daily

## 2024-12-19 ENCOUNTER — TELEPHONE (OUTPATIENT)
Facility: CLINIC | Age: 89
End: 2024-12-19

## 2024-12-19 NOTE — TELEPHONE ENCOUNTER
Erma Jones called from [facility] to advise Dr. Escobar they've been unable to provide services for pt, stating his wife has declined all appointments offered for the past 3 days and advised them they both would be out of town for the holidays.     If Dr. Escobar feels pt is in need of services, Erma is asking if we can reach out to pt. Tonom

## 2024-12-20 NOTE — TELEPHONE ENCOUNTER
Spoke with patient and wife and they said they have not declined any appts. Patient's wife said [facility] has not called to schedule any appointments for Bill that she can recall. She asked that we call Erma and let her know that they want to move forward with scheduling for patient. I left a vm for Erma letting her know that information. Ms. Obregon also has [employer] phone number and will call them later to see if she can get this sorted out.     Zacarias

## 2025-01-20 ENCOUNTER — TELEPHONE (OUTPATIENT)
Facility: CLINIC | Age: 89
End: 2025-01-20

## 2025-01-20 NOTE — TELEPHONE ENCOUNTER
Pt didn't want to take his medication this morning b-12 and plavix he has an appointment tomorrow. Wanted to make sure it would be ok to skip a dose

## 2025-01-20 NOTE — TELEPHONE ENCOUNTER
Called and spoke with pt's wife.     Pt's wife confirms pt has gotten out of bed and is about to take both medications.     Pt will keep scheduled appt for tomorrow.

## 2025-01-21 ENCOUNTER — OFFICE VISIT (OUTPATIENT)
Facility: CLINIC | Age: 89
End: 2025-01-21
Payer: MEDICARE

## 2025-01-21 VITALS
HEART RATE: 89 BPM | RESPIRATION RATE: 20 BRPM | TEMPERATURE: 98 F | BODY MASS INDEX: 33.43 KG/M2 | HEIGHT: 67 IN | OXYGEN SATURATION: 94 % | SYSTOLIC BLOOD PRESSURE: 149 MMHG | WEIGHT: 213 LBS | DIASTOLIC BLOOD PRESSURE: 86 MMHG

## 2025-01-21 DIAGNOSIS — G45.9 TIA (TRANSIENT ISCHEMIC ATTACK): ICD-10-CM

## 2025-01-21 PROCEDURE — 99214 OFFICE O/P EST MOD 30 MIN: CPT | Performed by: FAMILY MEDICINE

## 2025-01-21 PROCEDURE — 1123F ACP DISCUSS/DSCN MKR DOCD: CPT | Performed by: FAMILY MEDICINE

## 2025-01-21 PROCEDURE — 1125F AMNT PAIN NOTED PAIN PRSNT: CPT | Performed by: FAMILY MEDICINE

## 2025-01-21 PROCEDURE — 1036F TOBACCO NON-USER: CPT | Performed by: FAMILY MEDICINE

## 2025-01-21 PROCEDURE — G8428 CUR MEDS NOT DOCUMENT: HCPCS | Performed by: FAMILY MEDICINE

## 2025-01-21 PROCEDURE — G8417 CALC BMI ABV UP PARAM F/U: HCPCS | Performed by: FAMILY MEDICINE

## 2025-01-21 SDOH — ECONOMIC STABILITY: FOOD INSECURITY: WITHIN THE PAST 12 MONTHS, YOU WORRIED THAT YOUR FOOD WOULD RUN OUT BEFORE YOU GOT MONEY TO BUY MORE.: NEVER TRUE

## 2025-01-21 SDOH — ECONOMIC STABILITY: FOOD INSECURITY: WITHIN THE PAST 12 MONTHS, THE FOOD YOU BOUGHT JUST DIDN'T LAST AND YOU DIDN'T HAVE MONEY TO GET MORE.: NEVER TRUE

## 2025-01-21 ASSESSMENT — PATIENT HEALTH QUESTIONNAIRE - PHQ9
SUM OF ALL RESPONSES TO PHQ QUESTIONS 1-9: 0
1. LITTLE INTEREST OR PLEASURE IN DOING THINGS: NOT AT ALL
SUM OF ALL RESPONSES TO PHQ QUESTIONS 1-9: 0
SUM OF ALL RESPONSES TO PHQ9 QUESTIONS 1 & 2: 0
2. FEELING DOWN, DEPRESSED OR HOPELESS: NOT AT ALL
2. FEELING DOWN, DEPRESSED OR HOPELESS: NOT AT ALL
SUM OF ALL RESPONSES TO PHQ QUESTIONS 1-9: 0
SUM OF ALL RESPONSES TO PHQ9 QUESTIONS 1 & 2: 0
8. MOVING OR SPEAKING SO SLOWLY THAT OTHER PEOPLE COULD HAVE NOTICED. OR THE OPPOSITE, BEING SO FIGETY OR RESTLESS THAT YOU HAVE BEEN MOVING AROUND A LOT MORE THAN USUAL: NOT AT ALL
SUM OF ALL RESPONSES TO PHQ QUESTIONS 1-9: 0
4. FEELING TIRED OR HAVING LITTLE ENERGY: NOT AT ALL
7. TROUBLE CONCENTRATING ON THINGS, SUCH AS READING THE NEWSPAPER OR WATCHING TELEVISION: NOT AT ALL
SUM OF ALL RESPONSES TO PHQ QUESTIONS 1-9: 0
SUM OF ALL RESPONSES TO PHQ QUESTIONS 1-9: 0
6. FEELING BAD ABOUT YOURSELF - OR THAT YOU ARE A FAILURE OR HAVE LET YOURSELF OR YOUR FAMILY DOWN: NOT AT ALL
10. IF YOU CHECKED OFF ANY PROBLEMS, HOW DIFFICULT HAVE THESE PROBLEMS MADE IT FOR YOU TO DO YOUR WORK, TAKE CARE OF THINGS AT HOME, OR GET ALONG WITH OTHER PEOPLE: NOT DIFFICULT AT ALL
5. POOR APPETITE OR OVEREATING: NOT AT ALL
9. THOUGHTS THAT YOU WOULD BE BETTER OFF DEAD, OR OF HURTING YOURSELF: NOT AT ALL
SUM OF ALL RESPONSES TO PHQ QUESTIONS 1-9: 0
1. LITTLE INTEREST OR PLEASURE IN DOING THINGS: NOT AT ALL
SUM OF ALL RESPONSES TO PHQ QUESTIONS 1-9: 0
3. TROUBLE FALLING OR STAYING ASLEEP: NOT AT ALL

## 2025-01-21 NOTE — PROGRESS NOTES
Assessment & Plan  1. Recent fall.  He experienced a fall on Smooth night while applying lotion to his foot, resulting in a minor skin tear. The wound does not exhibit signs of infection and is not severe enough to warrant suturing. He reports chest pain, which is likely due to a pulled muscle rather than a fractured rib, as evidenced by the absence of bruising and the location of tenderness. Bacitracin ointment will be provided for wound care, with instructions to transition to regular Vaseline and keep the area covered. The wound is expected to heal within 1 to 2 weeks. A chest x-ray was offered to rule out rib fracture but was declined.    We talked about mechanical fall prevention, has just graduated physical therapy, but will continue working on exercises.    It was a pleasure seeing Mr. Clifton Liz today.  No follow-ups on file.    History of Present Illness  The patient is a 89-year-old male who presents for evaluation of a recent fall.    He experienced a fall on Smooth night, which he attributes to a loss of balance while applying lotion to his foot. He recalls the incident clearly, noting that he fell sideways and hit his head on a picture frame. He did not experience any pre-fall symptoms such as dizziness, lightheadedness, chest pain, or shortness of breath. There was no loss of consciousness during the event. He reports mild scalp tenderness when pressure is applied but has not developed any other symptoms of confusion. He has not had any trouble walking since the fall. He also reports chest pain, which he believes is unrelated to the fall. He suspects a rib injury, although there is no visible bruising. He does not experience pain during respiration but notes a slight twinge of discomfort. He finds it difficult to rise from a seated position due to chest soreness.    The following portions of the patient's history were reviewed and updated as appropriate: allergies, current medications, family

## 2025-01-21 NOTE — PROGRESS NOTES
Chief Complaint   Patient presents with    Fall     01/19/2025- pt states he feel and hit his head   Bruised his left arm      \"Have you been to the ER, urgent care clinic since your last visit?  Hospitalized since your last visit?\"    NO    “Have you seen or consulted any other health care providers outside of [facility] since your last visit?”    NO            Click Here for Release of Records Request

## 2025-01-24 DIAGNOSIS — I89.0 LYMPHEDEMA: Primary | ICD-10-CM

## 2025-01-29 ENCOUNTER — HOSPITAL ENCOUNTER (OUTPATIENT)
Facility: HOSPITAL | Age: 89
Setting detail: RECURRING SERIES
Discharge: HOME | End: 2025-02-01
Payer: MEDICARE

## 2025-01-29 PROCEDURE — 97760 ORTHOTIC MGMT&TRAING 1ST ENC: CPT

## 2025-01-29 PROCEDURE — 97535 SELF CARE MNGMENT TRAINING: CPT

## 2025-01-29 PROCEDURE — 97161 PT EVAL LOW COMPLEX 20 MIN: CPT

## 2025-01-29 NOTE — THERAPY EVALUATION
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 [x]   I87.2 Venous Insufficiency   [x]   I89.0 Lymphedema, other secondary  []   I83.019 Venous Stasis Ulcer LE, Right   []   I89.9 Unspecified Lymphatic Disorder []   I83.029 Venous Stasis Ulcer LE, Left   [x]   R60.9 Swelling not relieved by elevation []   Q82.0 Hereditary/ Congenital Lymphedema   []   C50.211 Malignant neoplasm of breast, Right []   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   []   C50.212 Malignant neoplasm of breast, Left []   L03.115 LE Cellulitis, Right   []    []   L03.116 LE Cellulitis, Left                                                           Clifton Liz JrSavanna    1934  Page 2 of 2    Physician Order for DME for Diagnosis of Secondary Lymphedema as Listed on Statement of Medical Necessity, Page 1        Recommended Product:  Units Upper Extremity Rt Lt Units Lower Extremity Rt Lt    Circ-Aid, Ready Wrap, Sigvaris Arm   4 Inelastic binders (Circ-Aid, Farrow)  [x]Foot   [x]Below Knee   []Knee   []Thigh x x    Circ-Aid Ready Wrap, Sigvaris hand    Francis Juan R, night use []Full Leg  []Lower Leg      Tribute Arm, night use    Tribute, night use  []Full Leg  []Lower Leg      Francis Juan R Arm, night use    Jose Sleeve Leg/ Foot, night use      Gradiant Compression Sleeves & Gloves  []Custom [] RM Arm:  []CCL1 []CCL2 []CCL3  []Custom [] RM Glove: []CCL1 []CCL2 []CCL3      Gradient Compression Stockings   []Custom  []RM Lower Extremity:   []CCL1       []CCL2         []CCL3   []Knee       []Thigh        []Waist Length      Jose sleeve arm w/ hand, night use    Tribute Wrap, night use      Compression Bra          Compression Vest         The above patient was referred for treatment of Lymphedema due to the diagnosis

## 2025-01-29 NOTE — THERAPY EVALUATION
[facility]   a part of [facility]  [address], [address]   [place] [address]                                                    Phone:[phone]   Fax:[phone]                                                                                 PHYSICAL THERAPY LYMPHEDEMA - MEDICARE EVALUATION/PLAN OF CARE NOTE (updated 3/23)      Date: 2025          Patient Name:  Clifton Liz Jr. :  1934   Medical   Diagnosis:  Lymphedema [I89.0] Treatment Diagnosis:  I89.0     Lymphedema, not elsewhere classified, R26.89   Other abnormalities of gait and mobility, and R60.9     Edema, unspecified    Referral Source:  Jose J Escobar MD  Provider #:  [id number]                Insurance: Payor: MEDICARE / Plan: MEDICARE PART A AND B / Product Type: *No Product type* /        Patient  verified yes     Visit #   Current  / Total 1 6 per POC   Time   In / Out 1230 1340   Total Treatment Time 70   Total Timed Codes 45   1:1 Treatment Time 45      [facility] Totals Reminder:  bill using total billable   min of TIMED therapeutic procedures and modalities.   8-22 min = 1 unit; 23-37 min = 2 units; 38-52 min = 3 units;  53-67 min = 4 units; 68-82 min = 5 units         SUBJECTIVE  Pain Level (0-10 scale): L shoulder, chest wall: 6/10 since fall on 25   []constant [x]intermittent []improving []worsening []no change since onset    Any medication changes, allergies to medications, adverse drug reactions, diagnosis change, or new procedure performed?: [x] No    [] Yes (see summary sheet for update)  Medications: Verified on Patient Summary List    Subjective functional status/changes:     Pt is a 89 yo male seen today for 6 month re-evaluation of bilateral LE secondary lymphedema, L>R. Patient arrives to appt wearing Juzo lower leg velcro garments, says he wears them every day and wears them on left leg every night. Pt does not wear foot wraps when going out, but wears

## 2025-02-04 ENCOUNTER — TELEPHONE (OUTPATIENT)
Facility: CLINIC | Age: 89
End: 2025-02-04

## 2025-02-04 NOTE — TELEPHONE ENCOUNTER
Lab orders placed. 12/18/2024.    Please call pt, and ask that male have labs drawn prior to scheduled appt, so results can be discussed at that time.

## 2025-02-10 RX ORDER — CLOTRIMAZOLE 1 %
CREAM (GRAM) TOPICAL
Qty: 120 G | Refills: 3 | Status: SHIPPED | OUTPATIENT
Start: 2025-02-10

## 2025-02-18 ENCOUNTER — OFFICE VISIT (OUTPATIENT)
Facility: CLINIC | Age: 89
End: 2025-02-18
Payer: MEDICARE

## 2025-02-18 VITALS
SYSTOLIC BLOOD PRESSURE: 117 MMHG | RESPIRATION RATE: 16 BRPM | OXYGEN SATURATION: 95 % | TEMPERATURE: 99 F | DIASTOLIC BLOOD PRESSURE: 70 MMHG | BODY MASS INDEX: 32.8 KG/M2 | HEART RATE: 92 BPM | HEIGHT: 67 IN | WEIGHT: 209 LBS

## 2025-02-18 DIAGNOSIS — E53.8 VITAMIN B 12 DEFICIENCY: Primary | ICD-10-CM

## 2025-02-18 DIAGNOSIS — Z86.73 HISTORY OF STROKE: ICD-10-CM

## 2025-02-18 PROBLEM — G45.9 TIA (TRANSIENT ISCHEMIC ATTACK): Status: RESOLVED | Noted: 2024-12-18 | Resolved: 2025-02-18

## 2025-02-18 PROCEDURE — 1159F MED LIST DOCD IN RCRD: CPT | Performed by: STUDENT IN AN ORGANIZED HEALTH CARE EDUCATION/TRAINING PROGRAM

## 2025-02-18 PROCEDURE — 1123F ACP DISCUSS/DSCN MKR DOCD: CPT | Performed by: STUDENT IN AN ORGANIZED HEALTH CARE EDUCATION/TRAINING PROGRAM

## 2025-02-18 PROCEDURE — 99214 OFFICE O/P EST MOD 30 MIN: CPT | Performed by: STUDENT IN AN ORGANIZED HEALTH CARE EDUCATION/TRAINING PROGRAM

## 2025-02-18 PROCEDURE — 1126F AMNT PAIN NOTED NONE PRSNT: CPT | Performed by: STUDENT IN AN ORGANIZED HEALTH CARE EDUCATION/TRAINING PROGRAM

## 2025-02-18 PROCEDURE — G8417 CALC BMI ABV UP PARAM F/U: HCPCS | Performed by: STUDENT IN AN ORGANIZED HEALTH CARE EDUCATION/TRAINING PROGRAM

## 2025-02-18 PROCEDURE — G8427 DOCREV CUR MEDS BY ELIG CLIN: HCPCS | Performed by: STUDENT IN AN ORGANIZED HEALTH CARE EDUCATION/TRAINING PROGRAM

## 2025-02-18 PROCEDURE — 1036F TOBACCO NON-USER: CPT | Performed by: STUDENT IN AN ORGANIZED HEALTH CARE EDUCATION/TRAINING PROGRAM

## 2025-02-18 RX ORDER — CLOPIDOGREL BISULFATE 75 MG/1
75 TABLET ORAL DAILY
Qty: 90 TABLET | Refills: 0 | Status: SHIPPED | OUTPATIENT
Start: 2025-02-18

## 2025-02-18 ASSESSMENT — ENCOUNTER SYMPTOMS
GASTROINTESTINAL NEGATIVE: 1
RESPIRATORY NEGATIVE: 1
ALLERGIC/IMMUNOLOGIC NEGATIVE: 1
EYES NEGATIVE: 1

## 2025-02-18 NOTE — PATIENT INSTRUCTIONS
This is what we discussed today:    Complete blood work ordered by Dr. Escobar and today's blood work ( Vitamin B12 and Folate levels).  I have refilled your Plavix.       Thank you for taking an active part in your health management!

## 2025-02-18 NOTE — PROGRESS NOTES
Family Medicine Office Visit  Patient: Clifton Liz Jr.  1934, 89 y.o., male  Encounter Date: 2/18/2025      CHIEF COMPLAINT  Chief Complaint   Patient presents with    Other     Clifton Liz Jr. is a 89 y.o. male who presents for B12 supplement and cognitive evaluation. Discuss medications.       SUBJECTIVE  Pat is new to me at this visit.   PCP Dr. Escobar.     Clifton Liz Jr. is a 89 y.o. male presenting for medication reevaluation.  Was last seen in office 1/21/25 by Dr. Escobar.    History obtained by patient and his wife.      Patient currently on Plavix.  Patient had TIA in 11/24, was hospitalized at [facility].  Was discharged with Plavix and aspirin for 21 days, followed by Plavix indefinitely.    He is requesting a referral of medication.    Vitamin B12 deficiency  During his hospitalization for the TIA in 11/24, patient was found to have decreased vitamin B12 levels.  He was started on oral supplementation.  His wife would like to know how long patient needs to take the medication.    Routine labs were previously ordered by PCP but have not yet been completed.      Review of Systems   Constitutional: Negative.    HENT: Negative.     Eyes: Negative.    Respiratory: Negative.     Cardiovascular: Negative.    Gastrointestinal: Negative.    Endocrine: Negative.    Genitourinary: Negative.    Musculoskeletal:  Positive for arthralgias.        Bilateral shoulder pain   Skin: Negative.    Allergic/Immunologic: Negative.    Neurological: Negative.    Hematological: Negative.    Psychiatric/Behavioral: Negative.          Social History     Tobacco Use   Smoking Status Former   Smokeless Tobacco Never     Social History     Substance and Sexual Activity   Alcohol Use Yes     Social History     Substance and Sexual Activity   Drug Use Never     Social History     Substance and Sexual Activity   Sexual Activity Not on file         HISTORICAL  Reviewed and updated today, and as noted

## 2025-02-18 NOTE — ASSESSMENT & PLAN NOTE
History of TIA in 11/2024.  Was on aspirin and Plavix for 21 days, now on Plavix only.  Patient is not on statin.  Plavix refilled today.    Orders:    clopidogrel (PLAVIX) 75 MG tablet; Take 1 tablet by mouth daily

## 2025-02-18 NOTE — PROGRESS NOTES
Chief Complaint   Patient presents with    Other     Clifton MICHAEL Liz Jr. is a 89 y.o. male who presents for B12 supplement and cognitive evaluation. Discuss medications.     \"Have you been to the ER, urgent care clinic since your last visit?  Hospitalized since your last visit?\"    NO    “Have you seen or consulted any other health care providers outside of [facility] since your last visit?”    NO            Click Here for Release of Records Request

## 2025-02-26 ENCOUNTER — HOSPITAL ENCOUNTER (EMERGENCY)
Facility: HOSPITAL | Age: 89
Discharge: HOME | End: 2025-02-26
Attending: EMERGENCY MEDICINE
Payer: MEDICARE

## 2025-02-26 ENCOUNTER — APPOINTMENT (OUTPATIENT)
Facility: HOSPITAL | Age: 89
End: 2025-02-26
Payer: MEDICARE

## 2025-02-26 ENCOUNTER — TELEPHONE (OUTPATIENT)
Facility: CLINIC | Age: 89
End: 2025-02-26

## 2025-02-26 VITALS
DIASTOLIC BLOOD PRESSURE: 62 MMHG | OXYGEN SATURATION: 98 % | TEMPERATURE: 98.4 F | WEIGHT: 208 LBS | HEIGHT: 67 IN | SYSTOLIC BLOOD PRESSURE: 125 MMHG | RESPIRATION RATE: 16 BRPM | HEART RATE: 89 BPM | BODY MASS INDEX: 32.65 KG/M2
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
## 2025-02-26 DIAGNOSIS — U07.1 COVID-19: Primary | ICD-10-CM

## 2025-02-26 LAB
ALBUMIN SERPL-MCNC: 3.8 G/DL (ref 3.5–5)
ALBUMIN/GLOB SERPL: 1 (ref 1.1–2.2)
ALP SERPL-CCNC: 89 U/L (ref 45–117)
ALT SERPL-CCNC: 22 U/L (ref 12–78)
ANION GAP SERPL CALC-SCNC: 5 MMOL/L (ref 2–12)
AST SERPL-CCNC: 34 U/L (ref 15–37)
BASOPHILS # BLD: 0.03 K/UL (ref 0–0.1)
BASOPHILS NFR BLD: 0.4 % (ref 0–1)
BILIRUB SERPL-MCNC: 0.8 MG/DL (ref 0.2–1)
BUN SERPL-MCNC: 11 MG/DL (ref 6–20)
BUN/CREAT SERPL: 13 (ref 12–20)
CALCIUM SERPL-MCNC: 9.5 MG/DL (ref 8.5–10.1)
CHLORIDE SERPL-SCNC: 99 MMOL/L (ref 97–108)
CO2 SERPL-SCNC: 29 MMOL/L (ref 21–32)
COMMENT:: NORMAL
CREAT SERPL-MCNC: 0.86 MG/DL (ref 0.7–1.3)
DIFFERENTIAL METHOD BLD: NORMAL
EOSINOPHIL # BLD: 0 K/UL (ref 0–0.4)
EOSINOPHIL NFR BLD: 0 % (ref 0–7)
ERYTHROCYTE [DISTWIDTH] IN BLOOD BY AUTOMATED COUNT: 14 % (ref 11.5–14.5)
FLUAV RNA SPEC QL NAA+PROBE: NOT DETECTED
FLUBV RNA SPEC QL NAA+PROBE: NOT DETECTED
GLOBULIN SER CALC-MCNC: 3.8 G/DL (ref 2–4)
GLUCOSE SERPL-MCNC: 98 MG/DL (ref 65–100)
HCT VFR BLD AUTO: 43.2 % (ref 36.6–50.3)
HGB BLD-MCNC: 14.8 G/DL (ref 12.1–17)
IMM GRANULOCYTES # BLD AUTO: 0.02 K/UL (ref 0–0.04)
IMM GRANULOCYTES NFR BLD AUTO: 0.3 % (ref 0–0.5)
LYMPHOCYTES # BLD: 0.99 K/UL (ref 0.8–3.5)
LYMPHOCYTES NFR BLD: 14.1 % (ref 12–49)
MCH RBC QN AUTO: 31 PG (ref 26–34)
MCHC RBC AUTO-ENTMCNC: 34.3 G/DL (ref 30–36.5)
MCV RBC AUTO: 90.4 FL (ref 80–99)
MONOCYTES # BLD: 0.73 K/UL (ref 0–1)
MONOCYTES NFR BLD: 10.4 % (ref 5–13)
NEUTS SEG # BLD: 5.27 K/UL (ref 1.8–8)
NEUTS SEG NFR BLD: 74.8 % (ref 32–75)
NRBC # BLD: 0 K/UL (ref 0–0.01)
NRBC BLD-RTO: 0 PER 100 WBC
NT PRO BNP: 756 PG/ML
PLATELET # BLD AUTO: 252 K/UL (ref 150–400)
PMV BLD AUTO: 10.2 FL (ref 8.9–12.9)
POTASSIUM SERPL-SCNC: 4.2 MMOL/L (ref 3.5–5.1)
PROT SERPL-MCNC: 7.6 G/DL (ref 6.4–8.2)
RBC # BLD AUTO: 4.78 M/UL (ref 4.1–5.7)
SARS-COV-2 RNA RESP QL NAA+PROBE: DETECTED
SODIUM SERPL-SCNC: 133 MMOL/L (ref 136–145)
SOURCE: ABNORMAL
SPECIMEN HOLD: NORMAL
TROPONIN I SERPL HS-MCNC: 17 NG/L (ref 0–76)
WBC # BLD AUTO: 7 K/UL (ref 4.1–11.1)

## 2025-02-26 PROCEDURE — 87636 SARSCOV2 & INF A&B AMP PRB: CPT

## 2025-02-26 PROCEDURE — 70450 CT HEAD/BRAIN W/O DYE: CPT

## 2025-02-26 PROCEDURE — 83880 ASSAY OF NATRIURETIC PEPTIDE: CPT

## 2025-02-26 PROCEDURE — 84484 ASSAY OF TROPONIN QUANT: CPT

## 2025-02-26 PROCEDURE — 85025 COMPLETE CBC W/AUTO DIFF WBC: CPT

## 2025-02-26 PROCEDURE — 80053 COMPREHEN METABOLIC PANEL: CPT

## 2025-02-26 PROCEDURE — 6370000000 HC RX 637 (ALT 250 FOR IP): Performed by: EMERGENCY MEDICINE

## 2025-02-26 PROCEDURE — 99284 EMERGENCY DEPT VISIT MOD MDM: CPT

## 2025-02-26 PROCEDURE — 71046 X-RAY EXAM CHEST 2 VIEWS: CPT

## 2025-02-26 PROCEDURE — 36415 COLL VENOUS BLD VENIPUNCTURE: CPT

## 2025-02-26 RX ORDER — BENZONATATE 100 MG/1
100 CAPSULE ORAL 3 TIMES DAILY PRN
Qty: 30 CAPSULE | Refills: 0 | Status: SHIPPED | OUTPATIENT
Start: 2025-02-26 | End: 2025-03-08

## 2025-02-26 RX ORDER — NIRMATRELVIR AND RITONAVIR 150-100 MG
KIT ORAL
Qty: 20 TABLET | Refills: 0 | Status: SHIPPED | OUTPATIENT
Start: 2025-02-26 | End: 2025-03-03

## 2025-02-26 RX ORDER — BENZONATATE 100 MG/1
100 CAPSULE ORAL
Status: COMPLETED | OUTPATIENT
Start: 2025-02-26 | End: 2025-02-26

## 2025-02-26 RX ADMIN — BENZONATATE 100 MG: 100 CAPSULE ORAL at 14:44

## 2025-02-26 ASSESSMENT — LIFESTYLE VARIABLES
HOW MANY STANDARD DRINKS CONTAINING ALCOHOL DO YOU HAVE ON A TYPICAL DAY: 1 OR 2
HOW OFTEN DO YOU HAVE A DRINK CONTAINING ALCOHOL: 4 OR MORE TIMES A WEEK

## 2025-02-26 ASSESSMENT — PAIN - FUNCTIONAL ASSESSMENT: PAIN_FUNCTIONAL_ASSESSMENT: NONE - DENIES PAIN

## 2025-02-26 NOTE — ED NOTES
Pt was discharged at this time.  pt verbalized understanding of all discharge instructions. Pt remains a&ox3. Resps are even and unlabored. Skin warm and dry. No distress noted. Pt assisted out of ed via wheelchair.

## 2025-02-26 NOTE — TELEPHONE ENCOUNTER
Pt's wife stopped by to advise Dr. Escobar pt is currently at [facility] after going in for falling and was just diagnosed with Covid, noting pt had been too weak to get up on his own.  Tonom

## 2025-02-26 NOTE — ED PROVIDER NOTES
[facility] EMERGENCY DEPARTMENT  EMERGENCY DEPARTMENT ENCOUNTER      Pt Name: Clifton Liz Jr.  MRN: [id number]  Birthdate 1934  Date of evaluation: 2/26/2025  Provider: Zia Jeter DO      HISTORY OF PRESENT ILLNESS      HPI  Stating that 2 days ago he began having nasal congestion, cough and generalized fatigue.  He states that 2 days ago he tripped and fell and hit the side of his head but denies any LOC no amnesia of the event.  He is on Plavix.  He denies any significant shortness of breath or chest pain.  No syncope.  No GI symptoms.  He is vaccinated against COVID-19.      Nursing Notes were reviewed.    REVIEW OF SYSTEMS         Review of Systems        PAST MEDICAL HISTORY     Past Medical History:   Diagnosis Date    Acute left-sided low back pain without sciatica 10/17/2023    Chronic DDD with hx of L1 compression fracture without intervention.      Had a fall x 2 this past few months, had not had any additional imaging despite acute worsening pain. Denies neurologic symptoms, though has lost confidence in his walking. Denies absolute weakness/numbness, bladder or bowel dysfunction.    Arthritis 03/29/2024    Compression fracture of L1 lumbar vertebra (HCC) 09/20/2023    Fatigue 03/29/2024    Fluid retention 03/29/2024    Gout     Lymphedema     Prediabetes     Stroke (HCC)     TIA (transient ischemic attack) 12/18/2024    Background: Dec 13 2024, couple minutes loss of ability to recall telephone number, may have been other symptom though patient doesn't recall.  MRI confirmed     Medication: Plavix 75mg daily   Prev Meds: aspirin 81mg daily (for 21 days then stop).     Specialist: Neurology           SURGICAL HISTORY       Past Surgical History:   Procedure Laterality Date    ORTHOPEDIC SURGERY Left 10/27/2021    knee replacement    OTHER SURGICAL HISTORY      venous ablation left         CURRENT MEDICATIONS       Previous Medications    BETAMETHASONE DIPROPIONATE 0.05 %

## 2025-02-26 NOTE — ED TRIAGE NOTES
Pt arrives via EMS for a fall and cough and weakness for two days. Denies LOC or hitting head. Pt on plavix. Pt from home.

## 2025-03-06 ENCOUNTER — APPOINTMENT (OUTPATIENT)
Facility: HOSPITAL | Age: 89
End: 2025-03-06
Payer: MEDICARE

## 2025-03-13 ENCOUNTER — TELEMEDICINE (OUTPATIENT)
Facility: CLINIC | Age: 89
End: 2025-03-13

## 2025-03-13 DIAGNOSIS — E53.8 VITAMIN B 12 DEFICIENCY: ICD-10-CM

## 2025-03-13 DIAGNOSIS — Z86.16 HISTORY OF COVID-19: Primary | ICD-10-CM

## 2025-03-13 RX ORDER — OMEGA-3/DHA/EPA/FISH OIL 35-113.5MG
1000 TABLET,CHEWABLE ORAL DAILY
COMMUNITY
Start: 2025-02-12

## 2025-03-13 NOTE — PROGRESS NOTES
Chief Complaint   Patient presents with    Follow-Up from [facility]     Clifton MICHAEL Liz . is a 89 y.o. male who presents for a follow up visit.  Does he need B12     \"Have you been to the ER, urgent care clinic since your last visit?  Hospitalized since your last visit?\"    NO    “Have you seen or consulted any other health care providers outside of [facility] since your last visit?”    NO            Click Here for Release of Records Request  
    Tobacco Use    Smoking status: Former    Smokeless tobacco: Never   Substance Use Topics    Alcohol use: Yes        Current Outpatient Medications   Medication Sig Dispense Refill    [facility] VITAMIN B12 1000 MCG tablet Take 1 tablet by mouth daily      clopidogrel (PLAVIX) 75 MG tablet Take 1 tablet by mouth daily 90 tablet 0    clotrimazole (LOTRIMIN) 1 % cream APPLY TOPICALLY 2 TIMES DAILY FOR 2-4 WEEKS UNTIL THE RASH CLEARS 120 g 3    ketoconazole (NIZORAL) 2 % shampoo Apply topically daily as needed for Itching (scalp and beard during shower) Apply topically daily as needed. 120 mL 5    hydrocortisone 2.5 % cream Apply topically 2 times daily 453.6 g 1    VITAMIN D PO Take 1 tablet by mouth daily      terbinafine (ATHLETES FOOT, TERBINAFINE,) 1 % cream Apply topically 2 times daily. (Patient not taking: Reported on 3/13/2025) 42 g 3    betamethasone dipropionate 0.05 % ointment Apply topically (Patient not taking: Reported on 3/13/2025)       No current facility-administered medications for this visit.       PHYSICAL EXAMINATION:  No data recorded     Constitutional: [x] Appears well-developed and well-nourished [x] No apparent distress      [] Abnormal -     Mental status: [x] Alert and awake  [x] Oriented to person/place/time [x] Able to follow commands    [] Abnormal -     Eyes:   EOM    [x]  Normal    [] Abnormal -   Sclera  [x]  Normal    [] Abnormal -          Discharge [x]  None visible   [] Abnormal -     HENT: [x] Normocephalic, atraumatic  [] Abnormal -   [x] Mouth/Throat: Mucous membranes are moist    External Ears [x] Normal  [] Abnormal -    Neck: [x] No visualized mass [] Abnormal -     Pulmonary/Chest: [x] Respiratory effort normal   [x] No visualized signs of difficulty breathing or respiratory distress        [] Abnormal -      Musculoskeletal:           [x] Normal range of motion of neck        [] Abnormal -     Neurological:        [x] No Facial Asymmetry (Cranial nerve 7 motor function)

## 2025-03-15 LAB
ALBUMIN SERPL-MCNC: 4.3 G/DL (ref 3.6–4.6)
ALP SERPL-CCNC: 84 IU/L (ref 44–121)
ALT SERPL-CCNC: 15 IU/L (ref 0–44)
AST SERPL-CCNC: 20 IU/L (ref 0–40)
BASOPHILS # BLD AUTO: 0 X10E3/UL (ref 0–0.2)
BASOPHILS NFR BLD AUTO: 0 %
BILIRUB SERPL-MCNC: 0.7 MG/DL (ref 0–1.2)
BUN SERPL-MCNC: 14 MG/DL (ref 10–36)
BUN/CREAT SERPL: 17 (ref 10–24)
CALCIUM SERPL-MCNC: 9.1 MG/DL (ref 8.6–10.2)
CHLORIDE SERPL-SCNC: 101 MMOL/L (ref 96–106)
CO2 SERPL-SCNC: 25 MMOL/L (ref 20–29)
CREAT SERPL-MCNC: 0.83 MG/DL (ref 0.76–1.27)
EGFRCR SERPLBLD CKD-EPI 2021: 83 ML/MIN/1.73
EOSINOPHIL # BLD AUTO: 0.1 X10E3/UL (ref 0–0.4)
EOSINOPHIL NFR BLD AUTO: 1 %
ERYTHROCYTE [DISTWIDTH] IN BLOOD BY AUTOMATED COUNT: 12.6 % (ref 11.6–15.4)
GLOBULIN SER CALC-MCNC: 2 G/DL (ref 1.5–4.5)
GLUCOSE SERPL-MCNC: 166 MG/DL (ref 70–99)
HBA1C MFR BLD: 6.2 % (ref 4.8–5.6)
HCT VFR BLD AUTO: 42.2 % (ref 37.5–51)
HGB BLD-MCNC: 13.6 G/DL (ref 13–17.7)
IMM GRANULOCYTES # BLD AUTO: 0 X10E3/UL (ref 0–0.1)
IMM GRANULOCYTES NFR BLD AUTO: 0 %
LYMPHOCYTES # BLD AUTO: 2.4 X10E3/UL (ref 0.7–3.1)
LYMPHOCYTES NFR BLD AUTO: 30 %
MCH RBC QN AUTO: 30.5 PG (ref 26.6–33)
MCHC RBC AUTO-ENTMCNC: 32.2 G/DL (ref 31.5–35.7)
MCV RBC AUTO: 95 FL (ref 79–97)
MONOCYTES # BLD AUTO: 0.6 X10E3/UL (ref 0.1–0.9)
MONOCYTES NFR BLD AUTO: 8 %
NEUTROPHILS # BLD AUTO: 4.7 X10E3/UL (ref 1.4–7)
NEUTROPHILS NFR BLD AUTO: 61 %
PLATELET # BLD AUTO: 301 X10E3/UL (ref 150–450)
POTASSIUM SERPL-SCNC: 4.2 MMOL/L (ref 3.5–5.2)
PROT SERPL-MCNC: 6.3 G/DL (ref 6–8.5)
RBC # BLD AUTO: 4.46 X10E6/UL (ref 4.14–5.8)
SODIUM SERPL-SCNC: 140 MMOL/L (ref 134–144)
URATE SERPL-MCNC: 7 MG/DL (ref 3.8–8.4)
WBC # BLD AUTO: 7.9 X10E3/UL (ref 3.4–10.8)

## 2025-03-16 LAB
FOLATE SERPL-MCNC: 18.7 NG/ML
VIT B12 SERPL-MCNC: 577 PG/ML (ref 232–1245)

## 2025-03-17 ENCOUNTER — RESULTS FOLLOW-UP (OUTPATIENT)
Facility: CLINIC | Age: 89
End: 2025-03-17

## 2025-03-18 ENCOUNTER — RESULTS FOLLOW-UP (OUTPATIENT)
Facility: CLINIC | Age: 89
End: 2025-03-18

## 2025-03-19 ENCOUNTER — TELEPHONE (OUTPATIENT)
Facility: CLINIC | Age: 89
End: 2025-03-19

## 2025-03-19 DIAGNOSIS — E53.8 VITAMIN B 12 DEFICIENCY: Primary | ICD-10-CM

## 2025-03-19 NOTE — TELEPHONE ENCOUNTER
Pt had labs done on Friday the 14th and wanted to know if he still needed to take the B-12 he is almost out and didn't want to get anymore if not needed.   He will need a refill if he is to continue medication    Asking to leave message

## 2025-03-21 RX ORDER — OMEGA-3/DHA/EPA/FISH OIL 35-113.5MG
1000 TABLET,CHEWABLE ORAL DAILY
Qty: 90 TABLET | Refills: 3 | Status: SHIPPED | OUTPATIENT
Start: 2025-03-21 | End: 2025-03-21

## 2025-03-21 RX ORDER — OMEGA-3/DHA/EPA/FISH OIL 35-113.5MG
1000 TABLET,CHEWABLE ORAL DAILY
Qty: 90 TABLET | Refills: 3 | Status: SHIPPED | OUTPATIENT
Start: 2025-03-21

## 2025-03-21 NOTE — TELEPHONE ENCOUNTER
Ele called to back about B-12 since he will be continuing medication please send rx to    [facility] Gavin Catalan

## 2025-03-25 ENCOUNTER — HOSPITAL ENCOUNTER (OUTPATIENT)
Facility: HOSPITAL | Age: 89
Setting detail: RECURRING SERIES
Discharge: HOME | End: 2025-03-28
Payer: MEDICARE

## 2025-03-25 PROCEDURE — 97760 ORTHOTIC MGMT&TRAING 1ST ENC: CPT

## 2025-03-25 NOTE — PROGRESS NOTES
PHYSICAL THERAPY/OCCUPATIONAL THERAPY - MEDICARE DAILY TREATMENT NOTE (updated 3/23)      Date: 3/25/2025          Patient Name:  Clifton Liz Jr. :  1934   Medical   Diagnosis:  Lymphedema [I89.0] Treatment Diagnosis:  I89.0     Lymphedema, not elsewhere classified, R26.89   Other abnormalities of gait and mobility, and R60.9     Edema, unspecified    Referral Source:  Gayatri Vaca, * Insurance:   Payor: MEDICARE / Plan: MEDICARE PART A AND B / Product Type: *No Product type* /                     Patient  verified yes     Visit #   Current  / Total 2 6 per POC   Time   In / Out 1210 1250   Total Treatment Time 40   Total Timed Codes 40   1:1 Treatment Time 40      [facility] Totals Reminder:  bill using total billable   min of TIMED therapeutic procedures and modalities.   8-22 min = 1 unit; 23-37 min = 2 units; 38-52 min = 3 units;  53-67 min = 4 units; 68-82 min = 5 units         SUBJECTIVE    Pain Level (0-10 scale): pt denies pain     Any medication changes, allergies to medications, adverse drug reactions, diagnosis change, or new procedure performed?: [x] No    [] Yes (see summary sheet for update)  Medications: Verified on Patient Summary List    Subjective functional status/changes:     Pt arrives to appt wearing lower leg velcro garments, brought newer velcro garments for therapist to fit this visit. Pt had to cancel last appt since he had COVID. Pt feeling better but has continued to have falls at home. Therapist has previously recommended cane or walker, however pt arrives to appt without AD. Pt says his neighbor gave him a rollator. Pt agreeable to treatment.     OBJECTIVE      Therapeutic Procedures:  Tx Min Billable or 1:1 Min (if diff from Tx Min) Procedure, Rationale, Specifics   40  19159 Orthotic Management and Training UE, LE and/or trunk, initial orthotic(s) encounter (timed): assessment and fitting to improve positioning of lower extremity during weight bearing and gait,

## 2025-03-25 NOTE — THERAPY DISCHARGE
[facility]   a part of [facility][note 3]  [address], [address]   [place] [address]   Phone: [phone]  Fax: [phone]      DISCHARGE SUMMARY  Patient Name: Clifton Liz Jr. : 1934   Treatment/Medical Diagnosis: Lymphedema [I89.0]   Referral Source: Gayatri Vaca, *     Date of Initial Visit: 25 Attended Visits: 2 Missed Visits: 1     SUMMARY OF TREATMENT  Pt seen today for fitting of new velcro garments. Once again reviewed garment wear schedule, wash instructions, and precautions. Therapist also helped pt sort through and discard old garments that are worn and ineffective. Pt now has 1 new set of garments and 1 set of velcro garments as a spare. Pt has met all goals and discharged to independent home management. Recommend pt return in 6 months for re-evaluation and ordering of replacement garments or sooner as needed.      CURRENT STATUS    Patient/caregiver will demonstrate improved edema management as evidenced by performing donning/doffing of garments modified independent 3/3 times within session to aid in reducing risk for infection and promote transition to maintenance phase of CDT in 12 weeks.   Status at last Eval: not met   Current Status: Met 3/25/25  Goal Met?  yes    2.  Patient and/or caregiver will demonstrate independence and compliance with home compression routine for continued volume reduction and prevention of re-accumulation of fluid during maintenance phase of CDT within 12 weeks.   Status at last Eval: not met   Current Status: Met 3/25/25  Goal Met?  yes      RECOMMENDATIONS  Discontinue therapy. Progressing towards or have reached established goals.        Carolina Marquez, PT, [id number]       3/25/2025       1:42 PM    If you have any questions/comments please contact us directly at [phone].   Thank you for allowing us to assist in the care of your patient.

## 2025-06-09 DIAGNOSIS — I89.0 LYMPHEDEMA: Primary | ICD-10-CM

## 2025-06-12 DIAGNOSIS — Z86.73 HISTORY OF STROKE: ICD-10-CM

## 2025-06-12 RX ORDER — CLOPIDOGREL BISULFATE 75 MG/1
75 TABLET ORAL DAILY
Qty: 90 TABLET | Refills: 3 | Status: SHIPPED | OUTPATIENT
Start: 2025-06-12

## 2025-06-20 ENCOUNTER — OFFICE VISIT (OUTPATIENT)
Facility: CLINIC | Age: 89
End: 2025-06-20

## 2025-06-20 VITALS
OXYGEN SATURATION: 97 % | HEART RATE: 79 BPM | RESPIRATION RATE: 20 BRPM | DIASTOLIC BLOOD PRESSURE: 68 MMHG | HEIGHT: 67 IN | BODY MASS INDEX: 32.96 KG/M2 | TEMPERATURE: 98.1 F | WEIGHT: 210 LBS | SYSTOLIC BLOOD PRESSURE: 130 MMHG

## 2025-06-20 DIAGNOSIS — Z00.00 MEDICARE ANNUAL WELLNESS VISIT, SUBSEQUENT: Primary | ICD-10-CM

## 2025-06-20 DIAGNOSIS — B35.6 JOCK ITCH: ICD-10-CM

## 2025-06-20 DIAGNOSIS — G89.29 CHRONIC RIGHT SHOULDER PAIN: ICD-10-CM

## 2025-06-20 DIAGNOSIS — Z91.81 HISTORY OF FALLING: ICD-10-CM

## 2025-06-20 DIAGNOSIS — M25.511 CHRONIC RIGHT SHOULDER PAIN: ICD-10-CM

## 2025-06-20 DIAGNOSIS — R73.03 PREDIABETES: ICD-10-CM

## 2025-06-20 RX ORDER — CLOTRIMAZOLE 1 %
CREAM (GRAM) TOPICAL
Qty: 120 G | Refills: 3 | Status: SHIPPED | OUTPATIENT
Start: 2025-06-20

## 2025-06-20 ASSESSMENT — PATIENT HEALTH QUESTIONNAIRE - PHQ9
2. FEELING DOWN, DEPRESSED OR HOPELESS: NOT AT ALL
10. IF YOU CHECKED OFF ANY PROBLEMS, HOW DIFFICULT HAVE THESE PROBLEMS MADE IT FOR YOU TO DO YOUR WORK, TAKE CARE OF THINGS AT HOME, OR GET ALONG WITH OTHER PEOPLE: NOT DIFFICULT AT ALL
5. POOR APPETITE OR OVEREATING: NOT AT ALL
SUM OF ALL RESPONSES TO PHQ QUESTIONS 1-9: 0
3. TROUBLE FALLING OR STAYING ASLEEP: NOT AT ALL
SUM OF ALL RESPONSES TO PHQ QUESTIONS 1-9: 0
4. FEELING TIRED OR HAVING LITTLE ENERGY: NOT AT ALL
9. THOUGHTS THAT YOU WOULD BE BETTER OFF DEAD, OR OF HURTING YOURSELF: NOT AT ALL
1. LITTLE INTEREST OR PLEASURE IN DOING THINGS: NOT AT ALL
8. MOVING OR SPEAKING SO SLOWLY THAT OTHER PEOPLE COULD HAVE NOTICED. OR THE OPPOSITE, BEING SO FIGETY OR RESTLESS THAT YOU HAVE BEEN MOVING AROUND A LOT MORE THAN USUAL: NOT AT ALL
7. TROUBLE CONCENTRATING ON THINGS, SUCH AS READING THE NEWSPAPER OR WATCHING TELEVISION: NOT AT ALL
6. FEELING BAD ABOUT YOURSELF - OR THAT YOU ARE A FAILURE OR HAVE LET YOURSELF OR YOUR FAMILY DOWN: NOT AT ALL

## 2025-06-20 ASSESSMENT — LIFESTYLE VARIABLES
HOW MANY STANDARD DRINKS CONTAINING ALCOHOL DO YOU HAVE ON A TYPICAL DAY: 1 OR 2
HOW OFTEN DO YOU HAVE A DRINK CONTAINING ALCOHOL: MONTHLY OR LESS

## 2025-06-20 NOTE — PATIENT INSTRUCTIONS
history including lifestyle, illnesses that may run in your family, and various assessments and screenings as appropriate.  After reviewing your medical record and screening and assessments performed today your provider may have ordered immunizations, labs, imaging, and/or referrals for you.  A list of these orders (if applicable) as well as your Preventive Care list are included within your After Visit Summary for your review.

## 2025-06-20 NOTE — PROGRESS NOTES
Chief Complaint   Patient presents with    Medicare AW     AWV [employer] is ok still having issue with jock itch.  Change I medication.  Stopped asa and started plavix.   Having bilat shoulder pain and loss of rom probably due to age per pt,

## 2025-06-20 NOTE — PROGRESS NOTES
Medicare Annual Wellness Visit    Clifton Liz Jr. is here for Medicare AWV (AWV [employer] is ok still having issue with jock itch.  Change I medication.  Stopped asa and started plavix. /Having bilat shoulder pain and loss of rom probably due to age per pt, )    Assessment & Plan  ). Medicare AWV  - see below, work on diet, activity, carb reduction, PT for upper body.    1. Fall risk: Chronic.  - Multiple recent falls, including one in shower.  - Discussed regular use of cane for stability.  - Referral to physical therapy for balance.    2. Right shoulder pain: Chronic.  - Limited ROM and pain in right shoulder. Positive crepitus and compensatory movements.  - Referral to orthopedic shoulder team for evaluation and potential injections.  - Ordered shoulder x-ray.  - Provided shoulder arthritis exercises handout.    3. Jock itch: Chronic.  - Improved with clotrimazole cream.  - Advised to continue ketoconazole shampoo and Lotrimin cream.  - Recommended dietary modifications to reduce sugar and carbohydrate intake.    4. Prediabetes: Chronic.  - Poorly controlled blood sugar may contribute to skin fungal infections.  - Recommended dietary changes to manage prediabetes and improve skin health.    Follow-up  - Referral to orthopedic shoulder team for evaluation and potential injections.  - Ordered shoulder x-ray.    Medicare annual wellness visit, subsequent  History of falling  Jock itch  -     clotrimazole (LOTRIMIN) 1 % cream; Apply topically 2 times daily., Disp-120 g, R-3, Normal  Chronic right shoulder pain  -     [facility] - Physical Therapy at [facility]  -     [facility] - Jesica Elkins MD, Orthopedic Surgery (shoulder)[facility]  -     XR SHOULDER RIGHT (MIN 2 VIEWS); Future    Results         No follow-ups on file.     Subjective   The following acute and/or chronic problems were also addressed today:    History of Present Illness  The patient, a 89-year-old male, presents for a Medicare annual

## 2025-07-03 ENCOUNTER — OFFICE VISIT (OUTPATIENT)
Age: 89
End: 2025-07-03

## 2025-07-03 ENCOUNTER — TELEPHONE (OUTPATIENT)
Facility: CLINIC | Age: 89
End: 2025-07-03

## 2025-07-03 ENCOUNTER — HOSPITAL ENCOUNTER (OUTPATIENT)
Facility: HOSPITAL | Age: 89
Discharge: HOME | End: 2025-07-03
Payer: MEDICARE

## 2025-07-03 VITALS
WEIGHT: 210 LBS | HEART RATE: 72 BPM | BODY MASS INDEX: 32.96 KG/M2 | SYSTOLIC BLOOD PRESSURE: 131 MMHG | OXYGEN SATURATION: 95 % | DIASTOLIC BLOOD PRESSURE: 71 MMHG | TEMPERATURE: 97.6 F | HEIGHT: 67 IN

## 2025-07-03 DIAGNOSIS — G89.29 CHRONIC RIGHT SHOULDER PAIN: ICD-10-CM

## 2025-07-03 DIAGNOSIS — M25.511 CHRONIC RIGHT SHOULDER PAIN: Primary | ICD-10-CM

## 2025-07-03 DIAGNOSIS — G89.29 CHRONIC RIGHT SHOULDER PAIN: Primary | ICD-10-CM

## 2025-07-03 DIAGNOSIS — M12.811 ROTATOR CUFF ARTHROPATHY OF RIGHT SHOULDER: ICD-10-CM

## 2025-07-03 DIAGNOSIS — M25.511 CHRONIC RIGHT SHOULDER PAIN: ICD-10-CM

## 2025-07-03 PROCEDURE — 73030 X-RAY EXAM OF SHOULDER: CPT

## 2025-07-03 RX ORDER — BETAMETHASONE SODIUM PHOSPHATE AND BETAMETHASONE ACETATE 3; 3 MG/ML; MG/ML
6 INJECTION, SUSPENSION INTRA-ARTICULAR; INTRALESIONAL; INTRAMUSCULAR; SOFT TISSUE ONCE
Status: COMPLETED | OUTPATIENT
Start: 2025-07-03 | End: 2025-07-03

## 2025-07-03 RX ADMIN — BETAMETHASONE SODIUM PHOSPHATE AND BETAMETHASONE ACETATE 6 MG: 3; 3 INJECTION, SUSPENSION INTRA-ARTICULAR; INTRALESIONAL; INTRAMUSCULAR; SOFT TISSUE at 16:04

## 2025-07-03 NOTE — PROGRESS NOTES
for Itching (scalp and beard during shower) Apply topically daily as needed., Disp: 120 mL, Rfl: 5    hydrocortisone 2.5 % cream, Apply topically 2 times daily, Disp: 453.6 g, Rfl: 1    VITAMIN D PO, Take 1 tablet by mouth daily, Disp: , Rfl:     All:  No Known Allergies    Social Hx:  Social History     Tobacco Use    Smoking status: Former    Smokeless tobacco: Never   Substance Use Topics    Alcohol use: Yes    Drug use: Never       Family Hx:  Family History   Problem Relation Age of Onset    No Known Problems Half-Brother     No Known Problems Half-Sister     Cancer Father         leukemia    Cancer Mother     No Known Problems Daughter     No Known Problems Son     No Known Problems Son          Review of Systems:       General: Denies headache, lethargy, fever, weight loss  Ears/Nose/Throat: Denies ear discharge, drainage, nosebleeds, hoarse voice, dental problems  Cardiovascular: Denies chest pain, shortness of breath  Lungs: Denies chest pain, breathing problems, wheezing, pneumonia  Stomach: Denies stomach pain, heartburn, constipation, irritable bowel  Skin: Denies rash, sores, open wounds  Musculoskeletal: right shoulder pain  Genitourinary: Denies dysuria, hematuria, polyuria  Gastrointestinal: Denies constipation, obstipation, diarrhea  Neurological: Denies changes in sight, smell, hearing, taste, seizures. Denies loss of consciousness.  Psychiatric: Denies depression, sleep pattern changes, anxiety, change in personality  Endocrine: Denies mood swings, heat or cold intolerance  Hematologic/Lymphatic: Denies anemia, purpura, petechia  Allergic/Immunologic: Denies swelling of throat, pain or swelling at lymph nodes      Physical Examination:    Vitals:    07/03/25 1503   BP: 131/71   Pulse: 72   Temp: 97.6 °F (36.4 °C)   SpO2: 95%        General: AOX3, no apparent distress  Psychiatric: mood and affect appropriate  Lungs: breathing is symmetric and unlabored bilaterally  Heart: regular rate and

## 2025-07-08 ENCOUNTER — HOSPITAL ENCOUNTER (OUTPATIENT)
Facility: HOSPITAL | Age: 89
Setting detail: RECURRING SERIES
Discharge: HOME | End: 2025-07-11
Payer: MEDICARE

## 2025-07-08 PROCEDURE — 97760 ORTHOTIC MGMT&TRAING 1ST ENC: CPT

## 2025-07-08 PROCEDURE — 97161 PT EVAL LOW COMPLEX 20 MIN: CPT

## 2025-07-08 PROCEDURE — 97535 SELF CARE MNGMENT TRAINING: CPT

## 2025-07-08 NOTE — THERAPY EVALUATION
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 [x]   I87.2 Venous Insufficiency   [x]   I89.0 Lymphedema, other secondary  []   I83.019 Venous Stasis Ulcer LE, Right   []   I89.9 Unspecified Lymphatic Disorder []   I83.029 Venous Stasis Ulcer LE, Left   [x]   R60.9 Swelling not relieved by elevation []   Q82.0 Hereditary/ Congenital Lymphedema   []   C50.211 Malignant neoplasm of breast, Right []   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   []   C50.212 Malignant neoplasm of breast, Left []   L03.115 LE Cellulitis, Right   []    []   L03.116 LE Cellulitis, Left                                                           Clifton Liz JrSavanna    1/19/1934  Page 2 of 2    Physician Order for DME for Diagnosis of Secondary Lymphedema as Listed on Statement of Medical Necessity, Page 1        Recommended Product:  Units Upper Extremity Rt Lt Units Lower Extremity Rt Lt    Circ-Aid, Ready Wrap, Sigvaris Arm   4 Inelastic binders (Circ-Aid, Farrow)  []Foot   [x]Below Knee   []Knee   []Thigh x x    Circ-Aid Ready Wrap, Sigvaris hand    Francis Juan R, night use []Full Leg  []Lower Leg      Tribute Arm, night use    Tribute, night use  []Full Leg  []Lower Leg      Francis Juan R Arm, night use    Jose Sleeve Leg/ Foot, night use      Gradiant Compression Sleeves & Gloves  []Custom [] RM Arm:  []CCL1 []CCL2 []CCL3  []Custom [] RM Glove: []CCL1 []CCL2 []CCL3      Gradient Compression Stockings   []Custom  []RM Lower Extremity:   []CCL1       []CCL2         []CCL3   []Knee       []Thigh        []Waist Length      Jose sleeve arm w/ hand, night use    Tribute Wrap, night use      Compression Bra          Compression Vest         The above patient was referred for treatment of Lymphedema due to the diagnosis 
counter for support as needed.    50 40    Total Total       Patient Education: [x] Review HEP    [x]  Patient Education billed concurrently with other procedures   [x] MLD Patient Education Continued education in self MLD technique with bathing and skin care  [] Progressed/Changed HEP based on:   [] positioning   [] Kinesiotape   [x] Skin care   [] wound care   [x] other: home safety   []    Patient / caregiver re-demonstrated bandaging. [] Yes  [x] No  Compression bandaging/garment precautions reviewed: [x] Yes  [] No    Skin assessment post-treatment (if applicable):    [x]  intact    []  redness- no adverse reaction                 []redness - adverse reaction:        Pain Level at end of session (0-10 scale): 0/10      Plan of Care / Statement of Necessity for Lymphedema Therapy Services     Assessment / key information:  Clifton Liz Jr is a 89 y.o. 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 Pt previously completed phase I CDT for limb volume reduction and transitioned to home maintenance phase with garment and pump use. Pt has demonstrated compliance with home compression routine. Pt presents today with stable limb volumes B LE compared with last visit in Jan 2025. Pt's velcro garments are worn and less effective at managing lymphedema. Pt measured this visit for replacement velcro calf wraps. Pt declined ordering foot wraps, says his are still in good condition. Reviewed size chart for calf wraps. Pt fits into size Medium, however pt/wife prefer to stick with the size large that they have been wearing due to occasional fluctuations in swelling. Discussed possibly needing to trim garments if straps are extending to the back side of the garment making it difficult to don. Pt to return for fitting of new garments upon receiving as needed, otherwise pt to return in 6 months for re-evaluation. Patient will benefit

## 2025-08-14 ENCOUNTER — OFFICE VISIT (OUTPATIENT)
Age: 89
End: 2025-08-14
Payer: MEDICARE

## 2025-08-14 VITALS — WEIGHT: 210 LBS | HEIGHT: 67 IN | BODY MASS INDEX: 32.96 KG/M2

## 2025-08-14 DIAGNOSIS — M12.811 ROTATOR CUFF ARTHROPATHY OF RIGHT SHOULDER: ICD-10-CM

## 2025-08-14 DIAGNOSIS — M25.511 CHRONIC RIGHT SHOULDER PAIN: Primary | ICD-10-CM

## 2025-08-14 DIAGNOSIS — G89.29 CHRONIC RIGHT SHOULDER PAIN: Primary | ICD-10-CM

## 2025-08-14 PROCEDURE — 1123F ACP DISCUSS/DSCN MKR DOCD: CPT | Performed by: STUDENT IN AN ORGANIZED HEALTH CARE EDUCATION/TRAINING PROGRAM

## 2025-08-14 PROCEDURE — 99212 OFFICE O/P EST SF 10 MIN: CPT | Performed by: STUDENT IN AN ORGANIZED HEALTH CARE EDUCATION/TRAINING PROGRAM

## 2025-08-14 PROCEDURE — 1126F AMNT PAIN NOTED NONE PRSNT: CPT | Performed by: STUDENT IN AN ORGANIZED HEALTH CARE EDUCATION/TRAINING PROGRAM

## 2025-08-14 PROCEDURE — 1159F MED LIST DOCD IN RCRD: CPT | Performed by: STUDENT IN AN ORGANIZED HEALTH CARE EDUCATION/TRAINING PROGRAM

## 2025-08-14 ASSESSMENT — PATIENT HEALTH QUESTIONNAIRE - PHQ9
SUM OF ALL RESPONSES TO PHQ QUESTIONS 1-9: 0
6. FEELING BAD ABOUT YOURSELF - OR THAT YOU ARE A FAILURE OR HAVE LET YOURSELF OR YOUR FAMILY DOWN: NOT AT ALL
8. MOVING OR SPEAKING SO SLOWLY THAT OTHER PEOPLE COULD HAVE NOTICED. OR THE OPPOSITE, BEING SO FIGETY OR RESTLESS THAT YOU HAVE BEEN MOVING AROUND A LOT MORE THAN USUAL: NOT AT ALL
SUM OF ALL RESPONSES TO PHQ QUESTIONS 1-9: 0
SUM OF ALL RESPONSES TO PHQ QUESTIONS 1-9: 0
5. POOR APPETITE OR OVEREATING: NOT AT ALL
9. THOUGHTS THAT YOU WOULD BE BETTER OFF DEAD, OR OF HURTING YOURSELF: NOT AT ALL
3. TROUBLE FALLING OR STAYING ASLEEP: NOT AT ALL
10. IF YOU CHECKED OFF ANY PROBLEMS, HOW DIFFICULT HAVE THESE PROBLEMS MADE IT FOR YOU TO DO YOUR WORK, TAKE CARE OF THINGS AT HOME, OR GET ALONG WITH OTHER PEOPLE: NOT DIFFICULT AT ALL
1. LITTLE INTEREST OR PLEASURE IN DOING THINGS: NOT AT ALL
7. TROUBLE CONCENTRATING ON THINGS, SUCH AS READING THE NEWSPAPER OR WATCHING TELEVISION: NOT AT ALL
2. FEELING DOWN, DEPRESSED OR HOPELESS: NOT AT ALL
SUM OF ALL RESPONSES TO PHQ QUESTIONS 1-9: 0
4. FEELING TIRED OR HAVING LITTLE ENERGY: NOT AT ALL